# Patient Record
Sex: MALE | Race: WHITE | Employment: UNEMPLOYED | URBAN - METROPOLITAN AREA
[De-identification: names, ages, dates, MRNs, and addresses within clinical notes are randomized per-mention and may not be internally consistent; named-entity substitution may affect disease eponyms.]

---

## 2017-03-14 ENCOUNTER — TELEPHONE (OUTPATIENT)
Dept: PULMONOLOGY | Age: 57
End: 2017-03-14

## 2017-03-22 ENCOUNTER — OFFICE VISIT (OUTPATIENT)
Dept: INTERNAL MEDICINE CLINIC | Age: 57
End: 2017-03-22

## 2017-03-22 VITALS
RESPIRATION RATE: 16 BRPM | DIASTOLIC BLOOD PRESSURE: 82 MMHG | BODY MASS INDEX: 29.2 KG/M2 | HEIGHT: 70 IN | SYSTOLIC BLOOD PRESSURE: 130 MMHG | WEIGHT: 204 LBS

## 2017-03-22 DIAGNOSIS — L73.1 INGROWN HAIR: ICD-10-CM

## 2017-03-22 DIAGNOSIS — M70.62 TROCHANTERIC BURSITIS, LEFT HIP: Primary | ICD-10-CM

## 2017-03-22 PROCEDURE — 99214 OFFICE O/P EST MOD 30 MIN: CPT | Performed by: INTERNAL MEDICINE

## 2017-03-22 RX ORDER — ETODOLAC 400 MG/1
400 TABLET, FILM COATED ORAL 2 TIMES DAILY
Qty: 60 TABLET | Refills: 3 | Status: SHIPPED | OUTPATIENT
Start: 2017-03-22 | End: 2019-05-07

## 2017-03-22 RX ORDER — CETIRIZINE HYDROCHLORIDE, PSEUDOEPHEDRINE HYDROCHLORIDE 5; 120 MG/1; MG/1
1 TABLET, FILM COATED, EXTENDED RELEASE ORAL 2 TIMES DAILY
Qty: 60 TABLET | Refills: 5 | Status: SHIPPED | OUTPATIENT
Start: 2017-03-22 | End: 2017-04-21

## 2017-03-22 RX ORDER — METHYLPREDNISOLONE 4 MG/1
TABLET ORAL
Qty: 1 KIT | Refills: 0 | Status: SHIPPED | OUTPATIENT
Start: 2017-03-22 | End: 2017-03-28

## 2017-03-28 ENCOUNTER — OFFICE VISIT (OUTPATIENT)
Dept: ORTHOPEDIC SURGERY | Age: 57
End: 2017-03-28

## 2017-03-28 VITALS
RESPIRATION RATE: 16 BRPM | DIASTOLIC BLOOD PRESSURE: 71 MMHG | HEIGHT: 70 IN | WEIGHT: 204 LBS | SYSTOLIC BLOOD PRESSURE: 111 MMHG | TEMPERATURE: 98 F | HEART RATE: 75 BPM | BODY MASS INDEX: 29.2 KG/M2

## 2017-03-28 DIAGNOSIS — M17.12 ARTHRITIS OF LEFT KNEE: Primary | ICD-10-CM

## 2017-03-28 PROCEDURE — 20610 DRAIN/INJ JOINT/BURSA W/O US: CPT | Performed by: PHYSICIAN ASSISTANT

## 2017-03-28 PROCEDURE — 99213 OFFICE O/P EST LOW 20 MIN: CPT | Performed by: PHYSICIAN ASSISTANT

## 2017-05-12 ENCOUNTER — OFFICE VISIT (OUTPATIENT)
Dept: INTERNAL MEDICINE CLINIC | Age: 57
End: 2017-05-12

## 2017-05-12 VITALS — HEIGHT: 70 IN | BODY MASS INDEX: 29.2 KG/M2 | TEMPERATURE: 97.8 F | RESPIRATION RATE: 16 BRPM | WEIGHT: 204 LBS

## 2017-05-12 DIAGNOSIS — J20.9 BRONCHITIS WITH BRONCHOSPASM: Primary | ICD-10-CM

## 2017-05-12 DIAGNOSIS — J01.00 ACUTE MAXILLARY SINUSITIS, RECURRENCE NOT SPECIFIED: ICD-10-CM

## 2017-05-12 PROCEDURE — 99214 OFFICE O/P EST MOD 30 MIN: CPT | Performed by: INTERNAL MEDICINE

## 2017-05-12 RX ORDER — AMOXICILLIN AND CLAVULANATE POTASSIUM 875; 125 MG/1; MG/1
1 TABLET, FILM COATED ORAL 2 TIMES DAILY
Qty: 20 TABLET | Refills: 0 | Status: SHIPPED | OUTPATIENT
Start: 2017-05-12 | End: 2017-05-22

## 2017-05-12 RX ORDER — METHYLPREDNISOLONE 4 MG/1
TABLET ORAL
Qty: 1 KIT | Refills: 0 | Status: SHIPPED | OUTPATIENT
Start: 2017-05-12 | End: 2017-05-18

## 2017-05-18 ENCOUNTER — TELEPHONE (OUTPATIENT)
Dept: INTERNAL MEDICINE CLINIC | Age: 57
End: 2017-05-18

## 2017-05-24 ENCOUNTER — OFFICE VISIT (OUTPATIENT)
Dept: INTERNAL MEDICINE CLINIC | Age: 57
End: 2017-05-24

## 2017-05-24 VITALS — TEMPERATURE: 98.3 F | HEIGHT: 70 IN | WEIGHT: 202 LBS | BODY MASS INDEX: 28.92 KG/M2 | RESPIRATION RATE: 16 BRPM

## 2017-05-24 DIAGNOSIS — H66.005 RECURRENT ACUTE SUPPURATIVE OTITIS MEDIA WITHOUT SPONTANEOUS RUPTURE OF LEFT TYMPANIC MEMBRANE: Primary | ICD-10-CM

## 2017-05-24 PROCEDURE — 99213 OFFICE O/P EST LOW 20 MIN: CPT | Performed by: INTERNAL MEDICINE

## 2017-05-24 RX ORDER — METHYLPREDNISOLONE 4 MG/1
TABLET ORAL
Qty: 1 KIT | Refills: 0 | Status: SHIPPED | OUTPATIENT
Start: 2017-05-24 | End: 2017-05-30

## 2017-05-24 RX ORDER — FLUTICASONE PROPIONATE 50 MCG
2 SPRAY, SUSPENSION (ML) NASAL 2 TIMES DAILY WITH MEALS
Qty: 1 BOTTLE | Refills: 3 | Status: SHIPPED | OUTPATIENT
Start: 2017-05-24 | End: 2019-05-07

## 2017-05-31 ENCOUNTER — OFFICE VISIT (OUTPATIENT)
Dept: INTERNAL MEDICINE CLINIC | Age: 57
End: 2017-05-31

## 2017-05-31 VITALS
DIASTOLIC BLOOD PRESSURE: 66 MMHG | SYSTOLIC BLOOD PRESSURE: 104 MMHG | RESPIRATION RATE: 16 BRPM | WEIGHT: 202 LBS | BODY MASS INDEX: 28.92 KG/M2 | HEIGHT: 70 IN

## 2017-05-31 DIAGNOSIS — M10.071 ACUTE IDIOPATHIC GOUT INVOLVING TOE OF RIGHT FOOT: Primary | ICD-10-CM

## 2017-05-31 PROCEDURE — 99213 OFFICE O/P EST LOW 20 MIN: CPT | Performed by: INTERNAL MEDICINE

## 2017-05-31 RX ORDER — METHYLPREDNISOLONE 4 MG/1
TABLET ORAL
Qty: 1 KIT | Refills: 0 | Status: SHIPPED | OUTPATIENT
Start: 2017-05-31 | End: 2017-06-06

## 2017-05-31 RX ORDER — INDOMETHACIN 50 MG/1
50 CAPSULE ORAL 3 TIMES DAILY
Qty: 30 CAPSULE | Refills: 3 | Status: SHIPPED | OUTPATIENT
Start: 2017-05-31 | End: 2019-05-07

## 2017-07-14 ENCOUNTER — OFFICE VISIT (OUTPATIENT)
Dept: INTERNAL MEDICINE CLINIC | Age: 57
End: 2017-07-14

## 2017-07-14 VITALS
HEIGHT: 70 IN | BODY MASS INDEX: 28.92 KG/M2 | SYSTOLIC BLOOD PRESSURE: 108 MMHG | RESPIRATION RATE: 16 BRPM | WEIGHT: 202 LBS | DIASTOLIC BLOOD PRESSURE: 64 MMHG

## 2017-07-14 DIAGNOSIS — G89.29 CHRONIC RIGHT SHOULDER PAIN: Primary | ICD-10-CM

## 2017-07-14 DIAGNOSIS — M25.511 CHRONIC RIGHT SHOULDER PAIN: Primary | ICD-10-CM

## 2017-07-14 PROCEDURE — 99213 OFFICE O/P EST LOW 20 MIN: CPT | Performed by: INTERNAL MEDICINE

## 2017-07-14 RX ORDER — LIDOCAINE 50 MG/G
1 PATCH TOPICAL DAILY
Qty: 30 PATCH | Refills: 0 | Status: SHIPPED | OUTPATIENT
Start: 2017-07-14 | End: 2019-05-07

## 2017-07-17 ENCOUNTER — OFFICE VISIT (OUTPATIENT)
Dept: ORTHOPEDIC SURGERY | Age: 57
End: 2017-07-17

## 2017-07-17 VITALS
TEMPERATURE: 98.6 F | HEIGHT: 70 IN | HEART RATE: 65 BPM | RESPIRATION RATE: 15 BRPM | SYSTOLIC BLOOD PRESSURE: 136 MMHG | WEIGHT: 202 LBS | DIASTOLIC BLOOD PRESSURE: 73 MMHG | BODY MASS INDEX: 28.92 KG/M2

## 2017-07-17 DIAGNOSIS — M75.81 RIGHT ROTATOR CUFF TENDONITIS: ICD-10-CM

## 2017-07-17 DIAGNOSIS — M25.562 ACUTE PAIN OF LEFT KNEE: Primary | ICD-10-CM

## 2017-07-17 DIAGNOSIS — M25.511 RIGHT SHOULDER PAIN, UNSPECIFIED CHRONICITY: ICD-10-CM

## 2017-07-17 DIAGNOSIS — M17.12 ARTHRITIS OF LEFT KNEE: ICD-10-CM

## 2017-07-17 PROCEDURE — 20610 DRAIN/INJ JOINT/BURSA W/O US: CPT | Performed by: PHYSICIAN ASSISTANT

## 2017-07-17 PROCEDURE — 99214 OFFICE O/P EST MOD 30 MIN: CPT | Performed by: PHYSICIAN ASSISTANT

## 2017-07-19 PROBLEM — M17.12 ARTHRITIS OF LEFT KNEE: Status: ACTIVE | Noted: 2017-07-19

## 2017-07-19 PROBLEM — M75.81 RIGHT ROTATOR CUFF TENDONITIS: Status: ACTIVE | Noted: 2017-07-19

## 2018-02-05 ENCOUNTER — OFFICE VISIT (OUTPATIENT)
Dept: INTERNAL MEDICINE CLINIC | Age: 58
End: 2018-02-05

## 2018-02-05 VITALS — BODY MASS INDEX: 28.92 KG/M2 | TEMPERATURE: 98.2 F | RESPIRATION RATE: 16 BRPM | HEIGHT: 70 IN | WEIGHT: 202 LBS

## 2018-02-05 DIAGNOSIS — J45.20 MILD INTERMITTENT ASTHMA WITHOUT COMPLICATION: ICD-10-CM

## 2018-02-05 DIAGNOSIS — J20.9 BRONCHITIS, ACUTE, WITH BRONCHOSPASM: Primary | ICD-10-CM

## 2018-02-05 PROCEDURE — G8419 CALC BMI OUT NRM PARAM NOF/U: HCPCS | Performed by: INTERNAL MEDICINE

## 2018-02-05 PROCEDURE — G8484 FLU IMMUNIZE NO ADMIN: HCPCS | Performed by: INTERNAL MEDICINE

## 2018-02-05 PROCEDURE — 3017F COLORECTAL CA SCREEN DOC REV: CPT | Performed by: INTERNAL MEDICINE

## 2018-02-05 PROCEDURE — 99214 OFFICE O/P EST MOD 30 MIN: CPT | Performed by: INTERNAL MEDICINE

## 2018-02-05 PROCEDURE — 1036F TOBACCO NON-USER: CPT | Performed by: INTERNAL MEDICINE

## 2018-02-05 PROCEDURE — G8427 DOCREV CUR MEDS BY ELIG CLIN: HCPCS | Performed by: INTERNAL MEDICINE

## 2018-02-05 RX ORDER — GUAIFENESIN AND PSEUDOEPHEDRINE HCL 1200; 120 MG/1; MG/1
TABLET, EXTENDED RELEASE ORAL
Qty: 20 TABLET | Refills: 0 | Status: SHIPPED | OUTPATIENT
Start: 2018-02-05 | End: 2019-05-07

## 2018-02-05 RX ORDER — ALBUTEROL SULFATE 90 UG/1
2 AEROSOL, METERED RESPIRATORY (INHALATION) EVERY 6 HOURS PRN
Qty: 1 INHALER | Refills: 3 | Status: SHIPPED | OUTPATIENT
Start: 2018-02-05 | End: 2019-02-05 | Stop reason: SDUPTHER

## 2018-02-05 RX ORDER — METHYLPREDNISOLONE 4 MG/1
TABLET ORAL
Qty: 1 KIT | Refills: 0 | Status: SHIPPED | OUTPATIENT
Start: 2018-02-05 | End: 2018-02-11

## 2018-02-05 RX ORDER — LEVOFLOXACIN 500 MG/1
500 TABLET, FILM COATED ORAL DAILY
Qty: 10 TABLET | Refills: 0 | Status: SHIPPED | OUTPATIENT
Start: 2018-02-05 | End: 2018-02-15

## 2018-02-05 RX ORDER — BUDESONIDE AND FORMOTEROL FUMARATE DIHYDRATE 160; 4.5 UG/1; UG/1
2 AEROSOL RESPIRATORY (INHALATION) 2 TIMES DAILY
Qty: 1 INHALER | Refills: 6 | Status: SHIPPED | OUTPATIENT
Start: 2018-02-05 | End: 2019-05-07

## 2019-05-02 ENCOUNTER — OFFICE VISIT (OUTPATIENT)
Dept: ORTHOPEDIC SURGERY | Age: 59
End: 2019-05-02
Payer: MEDICARE

## 2019-05-02 VITALS — TEMPERATURE: 98.4 F | HEIGHT: 70 IN | BODY MASS INDEX: 28.63 KG/M2 | WEIGHT: 200 LBS

## 2019-05-02 DIAGNOSIS — M17.12 ARTHRITIS OF LEFT KNEE: Primary | ICD-10-CM

## 2019-05-02 PROCEDURE — G8427 DOCREV CUR MEDS BY ELIG CLIN: HCPCS | Performed by: ORTHOPAEDIC SURGERY

## 2019-05-02 PROCEDURE — 99214 OFFICE O/P EST MOD 30 MIN: CPT | Performed by: ORTHOPAEDIC SURGERY

## 2019-05-02 PROCEDURE — 3017F COLORECTAL CA SCREEN DOC REV: CPT | Performed by: ORTHOPAEDIC SURGERY

## 2019-05-02 PROCEDURE — 1036F TOBACCO NON-USER: CPT | Performed by: ORTHOPAEDIC SURGERY

## 2019-05-02 PROCEDURE — G8419 CALC BMI OUT NRM PARAM NOF/U: HCPCS | Performed by: ORTHOPAEDIC SURGERY

## 2019-05-02 NOTE — PROGRESS NOTES
This dictation was done with [employer] dictation and may contain mechanical errors related to translation. Temperature 98.4 °F (36.9 °C), temperature source Temporal, height 5' 10\" (1.778 m), weight 200 lb (90.7 kg).

## 2019-05-02 NOTE — PATIENT INSTRUCTIONS
Patient Education        Knee: Exercises  Your Care Instructions  Here are some examples of exercises for your knee. Start each exercise slowly. Ease off the exercise if you start to have pain. Your doctor or physical therapist will tell you when you can start these exercises and which ones will work best for you. How to do the exercises  Quad sets    1. Sit with your leg straight and supported on the floor or a firm bed. (If you feel discomfort in the front or back of your knee, place a small towel roll under your knee.)  2. Tighten the muscles on top of your thigh by pressing the back of your knee flat down to the floor. (If you feel discomfort under your kneecap, place a small towel roll under your knee.)  3. Hold for about 6 seconds, then rest for up to 10 seconds. 4. Do 8 to 12 repetitions several times a day. Straight-leg raises to the front    1. Lie on your back with your good knee bent so that your foot rests flat on the floor. Your injured leg should be straight. Make sure that your low back has a normal curve. You should be able to slip your flat hand in between the floor and the small of your back, with your palm touching the floor and your back touching the back of your hand. 2. Tighten the thigh muscles in the injured leg by pressing the back of your knee flat down to the floor. Hold your knee straight. 3. Keeping the thigh muscles tight, lift your injured leg up so that your heel is about 12 inches off the floor. Hold for about 6 seconds and then lower slowly. 4. Do 8 to 12 repetitions, 3 times a day. Straight-leg raises to the outside    1. Lie on your side, with your injured leg on top. 2. Tighten the front thigh muscles of your injured leg to keep your knee straight. 3. Keep your hip and your leg straight in line with the rest of your body, and keep your knee pointing forward. Do not drop your hip back.   4. Lift your injured leg straight up toward the ceiling, about 12 inches off the weight to your ankle (not more than 5 pounds). With weight, you do not have to lift your leg more than 12 inches to get a hamstring workout. Shallow standing knee bends    1. Stand with your hands lightly resting on a counter or chair in front of you. Put your feet shoulder-width apart. 2. Slowly bend your knees so that you squat down like you are going to sit in a chair. Make sure your knees do not go in front of your toes. 3. Lower yourself about 6 inches. Your heels should remain on the floor at all times. 4. Rise slowly to a standing position. Heel raises    1. Stand with your feet a few inches apart, with your hands lightly resting on a counter or chair in front of you. 2. Slowly raise your heels off the floor while keeping your knees straight. 3. Hold for about 6 seconds, then slowly lower your heels to the floor. 4. Do 8 to 12 repetitions several times during the day. Follow-up care is a key part of your treatment and safety. Be sure to make and go to all appointments, and call your doctor if you are having problems. It's also a good idea to know your test results and keep a list of the medicines you take. Where can you learn more? Go to https://Between.[employer]. org and sign in to your [employer] account. Enter R605 in the [employer] box to learn more about \"Knee: Exercises. \"     If you do not have an account, please click on the \"Sign Up Now\" link. Current as of: September 20, 2018  Content Version: 11.9  © 2683-3307 [employer]. Care instructions adapted under license by [facility] ([facility]). If you have questions about a medical condition or this instruction, always ask your healthcare professional. [address] any warranty or liability for your use of this information.

## 2019-05-02 NOTE — LETTER
[address] [facility]  Surgery Scheduling Form:  [facility]    DEMOGRAPHICS:                                                                                                                Patient Name:  Severiano Feinstein  Patient :  1960   Patient SS#:      Patient Phone:  [phone] (home)                            Patient Address:  [address] [place] [address]    PCP:  Reyna Trejo MD  Insurance:   Payor/Plan Subscr  Sex Relation Sub. Ins. ID Effective Group Num   1. [employer]  1960 Male Self [id number]                                   [address]     DIAGNOSIS & PROCEDURE:                                                                                              Diagnosis:     Left arthritis knee     M17.12   Operation:  Left Total Knee Replacement   [address]  Location:  [place] [address]  Surgeon:  Parth Tanner. Ayesha Reyes MD    SCHEDULING INFORMATION:                                                                                         .  Surgeon's Scheduling Instruction:  elective  Requested Date:  7-3   OR Time:   Patient Arrival Time:  OR Time Required:  90  Minutes  Anesthesia:  Choice  Equipment:  ashley uncemented advanced  Mini C-Arm:  No   Standard C-Arm:  No  Status:  Same day admit  PAT Required:  Yes  Comments: NO femoral nerve block   ALLERGIES:Other                     Raul Edouard MD      19 3:54 PM  BILLING INFORMATION:                                                                                                     Procedure:       CPT Code Modifier                                            H&P AT:       PCP  XX                      [address]  LEFT TOTAL KNEE REPLACEMENT   1960     PHYSICIANS ORDERS  HEIGHT:   5'10          WEIGHT:   200      ALLERGIES:Other                           SURG  7-3                         JET     __________________________________________________________________  PRE-OP ORDERS:  ? CBC WITH DIFFERENTIAL                                                ? TYPE AND SCREEN                                                            ? HgB A1C                                                                               ? EKG                                                                                        ? NASAL CULUTRE MRSA  ? UAR/if positive repeat UAR on admission  ? BMP           ALBUMIN AND PREALBUMIN           VITAMIN D LEVELS  ? COAG PROFILE  ? SED RATE  ? PT/OT EVAL AND TEACHING  ? INSTRUCT PT TO STOP ALL NSAIDS, ASPIRIN, BLOOD THINNERS 7 DAYS PRIOR SURGERY  DAY OF SURGERY  ? CEFAZOLIN 2 GM IVPB; IF PATIENT WEIGHS > 80 KG AND SERUM CREATININE <2.5 mg/dl, GIVE 2 GM DOSE WITHIN 1 HOUR OF INCISION. ? IF THE PRE-OP NASAL CULTURE FOR MRSA WAS POSITIVE:   REPEAT NASAL SWAB ON ADMISSION AND ADMINISTER VANCOMYCIN 1 GM IVPB, REDUCE THE DOSE OF VANCOMYCIN  MG IVPB IF PT < 55 KG OR SERUM CREATININE > 2mg/dl; also to get Cefazolin 2 GM or wt based  ? All patients will receive preop Cefazolin 2 GM or wt based   ? APPLY KNEE HIGH ANTI-EMBOLIC AND PNEUMO-BOOTS TO UNOPERATIVE  LEG  ? CELEBREX  200 MG  ORALLY  DAY OF SURGERY  ? ROXICODONE 10MG  ORALLY DAY OF SURGERY  OTHER ORDERS:_______________________________________________________PHYSICIAN SIGNATURE: __ 5/2/19                                                                                                       3:54 PM  ________________________DATE:                          Supplemental Confidentiality & Payment Agreement & Supplemental HIPAA Notice for Shared Medical Appointments        During shared medical appointments, you will hear about other participants health issues and personal information.   As a matter of trust, it is your duty to keep everything you hear confidential.  Nothing that identifies a participant in any way (including job, ethnicity, Synagogue, etc.) can be shared outside of this group setting. I have read and agree to the following statements:        · I agree to meet with a group of patients and my doctor. I understand that I have the choice to be seen by my physician in this group or individually and that I may leave the group visit anytime I feel uncomfortable. · I understand that discussions will occur regarding individual identifiable health information during the shared medical appointment and I will maintain the confidentiality of [facility] health information or other information heard during the appointment. · I am committed to maintaining this confidentiality even if I am no longer participating in shared medical appointments. · I understand that the information I share with the physician and staff will be heard by the other participants and there is a possibility that the information disclosed in the shared medical appointment may be re-disclosed by other participants. I have been notified of this potential disclosure and I voluntarily agree to participate in the shared medical appointment. · I know that I dont have to share any personal information with the group or health care providers unless, I choose to do so. · Like any doctors appointment, I agree to be responsible for the bill and / or co-payment associated with this physician appointment. Shared Medical Appointment              THIS SUPPLEMENTAL NOTICE SUPPLEMENTS AND DOES NOT REPLACE THE [facility] CONSENT, PATIENT RESPONSIBILITY AND NOTICE OF PRIVACY PRACTICE. [place]    1960        Patients Signature: ____________________________________________________         DATE: ____/____/___      Ramy Leung / Support Persons Signature (if applicable) _________________________     DATE: __/__/__    **Each person will be asked to sign this commitment before each Shared Medical Appointment. Thank You ! SURGERY SCHEDULING TIMES                                                                                                                                                      Erica Jeter                                                                                       1960                                                                           SURGERY DATE: ___/___/___                                                                      SURGERY TIME:  ___:___ AM/PM                                                                      ARRIVAL TIME:   ___:___  AM/PM                                                                                                                        **PLEASE REPORT TO THE                                                       INFORMATION DESK IN THE MAIN LOBBY                                                          OF THE HOSPITAL.         [facility]                                                                        JET INFO           PT NAME:  Erica Jeter              Patient Phone:  [phone] (home)                                           1960    PCP:  PCP:  Daryle Star, MD                APPT DATE:  ___/___/___      DATE:  19        H&P __________    LABS: _________                      NEEDED:  __________    EKG:   _________                     NEEDED:  __________      JET DATE:       SURG DATE:   7-3

## 2019-05-03 DIAGNOSIS — M17.12 ARTHRITIS OF LEFT KNEE: ICD-10-CM

## 2019-05-03 LAB
ALBUMIN SERPL-MCNC: 4.2 G/DL (ref 3.4–5)
ANION GAP SERPL CALCULATED.3IONS-SCNC: 12 MMOL/L (ref 3–16)
APTT: 35.5 SEC (ref 26–36)
BASOPHILS ABSOLUTE: 0.1 K/UL (ref 0–0.2)
BASOPHILS RELATIVE PERCENT: 0.8 %
BUN BLDV-MCNC: 14 MG/DL (ref 7–20)
CALCIUM SERPL-MCNC: 9.4 MG/DL (ref 8.3–10.6)
CHLORIDE BLD-SCNC: 101 MMOL/L (ref 99–110)
CO2: 26 MMOL/L (ref 21–32)
CREAT SERPL-MCNC: 0.8 MG/DL (ref 0.9–1.3)
EOSINOPHILS ABSOLUTE: 0.7 K/UL (ref 0–0.6)
EOSINOPHILS RELATIVE PERCENT: 7.6 %
GFR AFRICAN AMERICAN: >60
GFR NON-AFRICAN AMERICAN: >60
GLUCOSE BLD-MCNC: 84 MG/DL (ref 70–99)
HCT VFR BLD CALC: 52.8 % (ref 40.5–52.5)
HEMOGLOBIN: 18.1 G/DL (ref 13.5–17.5)
INR BLD: 1.04 (ref 0.86–1.14)
LYMPHOCYTES ABSOLUTE: 3.2 K/UL (ref 1–5.1)
LYMPHOCYTES RELATIVE PERCENT: 36.2 %
MCH RBC QN AUTO: 33.9 PG (ref 26–34)
MCHC RBC AUTO-ENTMCNC: 34.3 G/DL (ref 31–36)
MCV RBC AUTO: 98.9 FL (ref 80–100)
MONOCYTES ABSOLUTE: 1 K/UL (ref 0–1.3)
MONOCYTES RELATIVE PERCENT: 11.5 %
NEUTROPHILS ABSOLUTE: 3.9 K/UL (ref 1.7–7.7)
NEUTROPHILS RELATIVE PERCENT: 43.9 %
PDW BLD-RTO: 14.4 % (ref 12.4–15.4)
PLATELET # BLD: 227 K/UL (ref 135–450)
PMV BLD AUTO: 10 FL (ref 5–10.5)
POTASSIUM SERPL-SCNC: 5.4 MMOL/L (ref 3.5–5.1)
PROTHROMBIN TIME: 11.8 SEC (ref 9.8–13)
RBC # BLD: 5.34 M/UL (ref 4.2–5.9)
SODIUM BLD-SCNC: 139 MMOL/L (ref 136–145)
TRANSFERRIN: 281 MG/DL (ref 200–360)
VITAMIN D 25-HYDROXY: 27.1 NG/ML
WBC # BLD: 8.8 K/UL (ref 4–11)

## 2019-05-04 LAB
ESTIMATED AVERAGE GLUCOSE: 119.8 MG/DL
HBA1C MFR BLD: 5.8 %

## 2019-05-06 ENCOUNTER — APPOINTMENT (OUTPATIENT)
Dept: CT IMAGING | Age: 59
End: 2019-05-06
Payer: MEDICARE

## 2019-05-06 ENCOUNTER — HOSPITAL ENCOUNTER (EMERGENCY)
Age: 59
Discharge: HOME | End: 2019-05-06
Attending: EMERGENCY MEDICINE
Payer: MEDICARE

## 2019-05-06 ENCOUNTER — APPOINTMENT (OUTPATIENT)
Dept: GENERAL RADIOLOGY | Age: 59
End: 2019-05-06
Payer: MEDICARE

## 2019-05-06 ENCOUNTER — PREP FOR PROCEDURE (OUTPATIENT)
Dept: ORTHOPEDICS UNIT | Age: 59
End: 2019-05-06

## 2019-05-06 VITALS
RESPIRATION RATE: 17 BRPM | OXYGEN SATURATION: 96 % | BODY MASS INDEX: 29.04 KG/M2 | SYSTOLIC BLOOD PRESSURE: 141 MMHG | HEIGHT: 71 IN | HEART RATE: 83 BPM | TEMPERATURE: 97 F | DIASTOLIC BLOOD PRESSURE: 70 MMHG | WEIGHT: 207.45 LBS

## 2019-05-06 DIAGNOSIS — S12.000A CLOSED DISPLACED FRACTURE OF FIRST CERVICAL VERTEBRA, UNSPECIFIED FRACTURE MORPHOLOGY, INITIAL ENCOUNTER (HCC): Primary | ICD-10-CM

## 2019-05-06 LAB
A/G RATIO: 0.7 (ref 1.1–2.2)
ALBUMIN SERPL-MCNC: 3.4 G/DL (ref 3.4–5)
ALP BLD-CCNC: 78 U/L (ref 40–129)
ALT SERPL-CCNC: 97 U/L (ref 10–40)
ANION GAP SERPL CALCULATED.3IONS-SCNC: 10 MMOL/L (ref 3–16)
APTT: 35.4 SEC (ref 26–36)
AST SERPL-CCNC: 81 U/L (ref 15–37)
BASOPHILS ABSOLUTE: 0.1 K/UL (ref 0–0.2)
BASOPHILS RELATIVE PERCENT: 0.8 %
BILIRUB SERPL-MCNC: 0.8 MG/DL (ref 0–1)
BILIRUBIN URINE: NEGATIVE
BLOOD, URINE: NEGATIVE
BUN BLDV-MCNC: 10 MG/DL (ref 7–20)
CALCIUM SERPL-MCNC: 9 MG/DL (ref 8.3–10.6)
CHLORIDE BLD-SCNC: 103 MMOL/L (ref 99–110)
CLARITY: CLEAR
CO2: 19 MMOL/L (ref 21–32)
COLOR: YELLOW
CREAT SERPL-MCNC: 0.7 MG/DL (ref 0.9–1.3)
EOSINOPHILS ABSOLUTE: 0.4 K/UL (ref 0–0.6)
EOSINOPHILS RELATIVE PERCENT: 3.3 %
GFR AFRICAN AMERICAN: >60
GFR NON-AFRICAN AMERICAN: >60
GLOBULIN: 4.6 G/DL
GLUCOSE BLD-MCNC: 105 MG/DL (ref 70–99)
GLUCOSE URINE: NEGATIVE MG/DL
HCT VFR BLD CALC: 51.6 % (ref 40.5–52.5)
HEMOGLOBIN: 17.7 G/DL (ref 13.5–17.5)
INR BLD: 1.07 (ref 0.86–1.14)
KETONES, URINE: NEGATIVE MG/DL
LEUKOCYTE ESTERASE, URINE: NEGATIVE
LYMPHOCYTES ABSOLUTE: 3 K/UL (ref 1–5.1)
LYMPHOCYTES RELATIVE PERCENT: 24.8 %
MCH RBC QN AUTO: 34 PG (ref 26–34)
MCHC RBC AUTO-ENTMCNC: 34.3 G/DL (ref 31–36)
MCV RBC AUTO: 99.2 FL (ref 80–100)
MICROSCOPIC EXAMINATION: NORMAL
MONOCYTES ABSOLUTE: 1.2 K/UL (ref 0–1.3)
MONOCYTES RELATIVE PERCENT: 10.3 %
NEUTROPHILS ABSOLUTE: 7.3 K/UL (ref 1.7–7.7)
NEUTROPHILS RELATIVE PERCENT: 60.8 %
NITRITE, URINE: NEGATIVE
PDW BLD-RTO: 14.1 % (ref 12.4–15.4)
PH UA: 5.5 (ref 5–8)
PLATELET # BLD: 198 K/UL (ref 135–450)
PMV BLD AUTO: 9 FL (ref 5–10.5)
POTASSIUM REFLEX MAGNESIUM: 4.3 MMOL/L (ref 3.5–5.1)
PROTEIN UA: NEGATIVE MG/DL
PROTHROMBIN TIME: 12.2 SEC (ref 9.8–13)
RBC # BLD: 5.21 M/UL (ref 4.2–5.9)
SODIUM BLD-SCNC: 132 MMOL/L (ref 136–145)
SPECIFIC GRAVITY UA: 1.02 (ref 1–1.03)
TOTAL PROTEIN: 8 G/DL (ref 6.4–8.2)
URINE TYPE: NORMAL
UROBILINOGEN, URINE: 0.2 E.U./DL
WBC # BLD: 11.9 K/UL (ref 4–11)

## 2019-05-06 PROCEDURE — 96374 THER/PROPH/DIAG INJ IV PUSH: CPT

## 2019-05-06 PROCEDURE — 85730 THROMBOPLASTIN TIME PARTIAL: CPT

## 2019-05-06 PROCEDURE — 72131 CT LUMBAR SPINE W/O DYE: CPT

## 2019-05-06 PROCEDURE — 96375 TX/PRO/DX INJ NEW DRUG ADDON: CPT

## 2019-05-06 PROCEDURE — 99284 EMERGENCY DEPT VISIT MOD MDM: CPT

## 2019-05-06 PROCEDURE — 72125 CT NECK SPINE W/O DYE: CPT

## 2019-05-06 PROCEDURE — 73560 X-RAY EXAM OF KNEE 1 OR 2: CPT

## 2019-05-06 PROCEDURE — 71101 X-RAY EXAM UNILAT RIBS/CHEST: CPT

## 2019-05-06 PROCEDURE — 81003 URINALYSIS AUTO W/O SCOPE: CPT

## 2019-05-06 PROCEDURE — 70498 CT ANGIOGRAPHY NECK: CPT

## 2019-05-06 PROCEDURE — 6360000004 HC RX CONTRAST MEDICATION: Performed by: EMERGENCY MEDICINE

## 2019-05-06 PROCEDURE — 6360000002 HC RX W HCPCS: Performed by: EMERGENCY MEDICINE

## 2019-05-06 PROCEDURE — 85610 PROTHROMBIN TIME: CPT

## 2019-05-06 PROCEDURE — 80053 COMPREHEN METABOLIC PANEL: CPT

## 2019-05-06 PROCEDURE — 70450 CT HEAD/BRAIN W/O DYE: CPT

## 2019-05-06 PROCEDURE — 85025 COMPLETE CBC W/AUTO DIFF WBC: CPT

## 2019-05-06 PROCEDURE — 72128 CT CHEST SPINE W/O DYE: CPT

## 2019-05-06 RX ORDER — OXYCODONE HYDROCHLORIDE AND ACETAMINOPHEN 5; 325 MG/1; MG/1
1 TABLET ORAL EVERY 6 HOURS PRN
Qty: 20 TABLET | Refills: 0 | Status: SHIPPED | OUTPATIENT
Start: 2019-05-06 | End: 2019-05-13

## 2019-05-06 RX ORDER — CELECOXIB 200 MG/1
200 CAPSULE ORAL ONCE
Status: CANCELLED | OUTPATIENT
Start: 2019-05-06 | End: 2019-05-06

## 2019-05-06 RX ORDER — MORPHINE SULFATE 2 MG/ML
4 INJECTION, SOLUTION INTRAMUSCULAR; INTRAVENOUS ONCE
Status: COMPLETED | OUTPATIENT
Start: 2019-05-06 | End: 2019-05-06

## 2019-05-06 RX ORDER — ONDANSETRON 2 MG/ML
4 INJECTION INTRAMUSCULAR; INTRAVENOUS ONCE
Status: COMPLETED | OUTPATIENT
Start: 2019-05-06 | End: 2019-05-06

## 2019-05-06 RX ORDER — OXYCODONE HYDROCHLORIDE 5 MG/1
10 TABLET ORAL ONCE
Status: CANCELLED | OUTPATIENT
Start: 2019-05-06 | End: 2019-05-06

## 2019-05-06 RX ORDER — CYCLOBENZAPRINE HCL 10 MG
10 TABLET ORAL NIGHTLY PRN
Qty: 30 TABLET | Refills: 0 | Status: SHIPPED | OUTPATIENT
Start: 2019-05-06 | End: 2019-05-16

## 2019-05-06 RX ADMIN — IOPAMIDOL 75 ML: 755 INJECTION, SOLUTION INTRAVENOUS at 13:31

## 2019-05-06 RX ADMIN — MORPHINE SULFATE 4 MG: 2 INJECTION, SOLUTION INTRAMUSCULAR; INTRAVENOUS at 12:35

## 2019-05-06 RX ADMIN — ONDANSETRON 4 MG: 2 INJECTION INTRAMUSCULAR; INTRAVENOUS at 12:34

## 2019-05-06 ASSESSMENT — PAIN SCALES - GENERAL
PAINLEVEL_OUTOF10: 9
PAINLEVEL_OUTOF10: 9
PAINLEVEL_OUTOF10: 3
PAINLEVEL_OUTOF10: 2
PAINLEVEL_OUTOF10: 9

## 2019-05-06 ASSESSMENT — PAIN DESCRIPTION - ONSET: ONSET: ON-GOING

## 2019-05-06 ASSESSMENT — PAIN DESCRIPTION - DESCRIPTORS: DESCRIPTORS: SHOOTING;SHARP

## 2019-05-06 ASSESSMENT — PAIN DESCRIPTION - ORIENTATION: ORIENTATION: RIGHT;LEFT

## 2019-05-06 ASSESSMENT — PAIN - FUNCTIONAL ASSESSMENT: PAIN_FUNCTIONAL_ASSESSMENT: ACTIVITIES ARE NOT PREVENTED

## 2019-05-06 ASSESSMENT — PAIN DESCRIPTION - FREQUENCY: FREQUENCY: CONTINUOUS

## 2019-05-06 ASSESSMENT — PAIN DESCRIPTION - LOCATION: LOCATION: NECK

## 2019-05-06 ASSESSMENT — PAIN DESCRIPTION - PAIN TYPE: TYPE: ACUTE PAIN

## 2019-05-06 ASSESSMENT — PAIN DESCRIPTION - PROGRESSION: CLINICAL_PROGRESSION: GRADUALLY WORSENING

## 2019-05-06 NOTE — ED PROVIDER NOTES
eMERGENCY dEPARTMENT eNCOUnter      Pt Name: Gaurav Villalobos  MRN: [id number]  [place] 1960  Date of evaluation: 5/6/2019  Provider: Stephenie Harding MD     [address] [place] [address]       Chief Complaint   Patient presents with    Fall     1900 yesterday, pt was cleaning gutters, fell 15 feet to ground. Pt states event is \"blurry\" to him, but does not believe he lost consciousness.  Neck Pain     stiff/sore, shooting pain 10/10 when turning neck, 4/10 when still.  Knee Pain     L knee 9/10. knee replacement scheduled 5/28/19. HISTORY OF PRESENT ILLNESS   (Location/Symptom, Timing/Onset,Context/Setting, Quality, Duration, Modifying Factors, Severity) Note limiting factors. HPI    Gaurav Villalobos is a 61 y.o. male who presents to the emergency department with neck pain after a fall from a two-story building. Patient apparently was working on his roof when he fell off. Patient landed on the ground head first and also landed on the left knee. Patient has history of knee pain and right chest wall pain. He thinks he might have broken a rib. He states this happened last night around 7 PM.  He was able to get up and took some Tylenol. She then went to bed and woke up this morning still having pain and drove himself to the emergency department for evaluation. Patient denies any numbness. There is no focal weakness. No nausea vomiting. Patient has no blurred vision. Nursing Notes were reviewed. REVIEW OFSYSTEMS    (2+ for level 4; 10+ for level 5)   Review of Systems    General: No fevers, chills or night sweats, No weight loss    Head:  No Sore throat,  No Ear Pain    Chest:  Nontender. No Cough, No SOB,  Chest Pain    GI: No abdominal pain or vomiting    : No dysuria or hematuria    Musculoskeletal: No unrelenting pain or night pain    Neurologic: No bowel or bladder incontinence, No saddle anesthesia, No leg weakness    All other systems reviewed and are negative.         PAST MEDICAL HISTORY Past Medical History:   Diagnosis Date    Arthritis     Asthma     Asthmatic bronchitis    Cancer ([facility] [address] 23 years ago    non-hodgkins lymphoma--5 weeks of radiation    Chronic pain of left knee 6/23/2016    Environmental and seasonal allergies 06/23/2016    cats/dogs/mold/milk    Hepatitis C     Moderate persistent asthma without complication 5/64/9863       SURGICAL HISTORY       Past Surgical History:   Procedure Laterality Date    APPENDECTOMY      KNEE ARTHROSCOPY Left 09/13/2016    L knee video arthroscopy with partial lateral menisectomy and plica resection    LYMPH NODE BIOPSY      lymph node in groin area    SHOULDER SURGERY Left 1978    reconstruction of left shoulder        CURRENT MEDICATIONS       Previous Medications    ALBUTEROL (PROVENTIL) (2.5 MG/3ML) 0.083% NEBULIZER SOLUTION    Take 2.5 mg by nebulization every 6 hours as needed for Wheezing or Shortness of Breath    BIOTIN 5000 MCG TABS    Take by mouth    BUDESONIDE-FORMOTEROL (SYMBICORT) 160-4.5 MCG/ACT AERO    Inhale 2 puffs into the lungs 2 times daily    DICLOFENAC SODIUM (VOLTAREN) 1 % GEL    Apply 4 g topically 4 times daily    ETODOLAC (LODINE) 400 MG TABLET    Take 1 tablet by mouth 2 times daily    FLUTICASONE (FLONASE) 50 MCG/ACT NASAL SPRAY    2 sprays by Nasal route 2 times daily (with meals)    INDOMETHACIN (INDOCIN) 50 MG CAPSULE    Take 1 capsule by mouth 3 times daily    LIDOCAINE (LIDODERM) 5 %    Place 1 patch onto the skin daily 12 hours on, 12 hours off. METHYLPREDNISOLONE (MEDROL DOSEPACK) 4 MG TABLET    Take 4 mg by mouth See Admin Instructions Take by mouth.     MONTELUKAST (SINGULAIR) 10 MG TABLET    Take 1 tablet by mouth nightly    MONTELUKAST (SINGULAIR) 10 MG TABLET    Take 1 tablet by mouth nightly    NAPROXEN SODIUM (ALEVE PO)    Take by mouth as needed     NONFORMULARY    15 drops 2 times daily HEMP OIL    OMEPRAZOLE (PRILOSEC) 40 MG CAPSULE    Take 1 capsule by mouth daily Take tablet one hour before evening meal    OMEPRAZOLE (PRILOSEC) 40 MG DELAYED RELEASE CAPSULE    Take 1 capsule by mouth daily Take tablet one hour before evening meal    PSEUDOEPHEDRINE-GUAIFENESIN 120-1200 MG TB12    1 tab po bid with a lot of fluids. VENTOLIN  (90 BASE) MCG/ACT INHALER    INHALE TWO PUFFS BY MOUTH EVERY 6 HOURS AS NEEDED FOR WHEEZING       ALLERGIES     Other    FAMILY HISTORY       Family History   Problem Relation Age of Onset    Asthma Mother     Cancer Father         SOCIAL HISTORY       Social History     Socioeconomic History    Marital status:      Spouse name: None    Number of children: None    Years of education: None    Highest education level: None   Occupational History    None   Social Needs    Financial resource strain: None    Food insecurity:     Worry: None     Inability: None    Transportation needs:     Medical: None     Non-medical: None   Tobacco Use    Smoking status: Never Smoker    Smokeless tobacco: Never Used   Substance and Sexual Activity    Alcohol use:  Yes     Alcohol/week: 1.2 oz     Types: 2 Shots of liquor per week    Drug use: Yes     Types: Marijuana     Comment: hemp oil now use to smoke marijuana    Sexual activity: Yes     Partners: Female   Lifestyle    Physical activity:     Days per week: None     Minutes per session: None    Stress: None   Relationships    Social connections:     Talks on phone: None     Gets together: None     Attends [employer] service: None     Active member of club or organization: None     Attends meetings of clubs or organizations: None     Relationship status: None    Intimate partner violence:     Fear of current or ex partner: None     Emotionally abused: None     Physically abused: None     Forced sexual activity: None   Other Topics Concern    None   Social History Narrative    None       SCREENINGS           PHYSICAL EXAM    (up to 7 for level 4, 8 or more for level 5)     ED Triage Vitals [05/06/19 1102] BP Temp Temp Source Pulse Resp SpO2 Height Weight   (!) 158/98 98.2 °F (36.8 °C) Oral 82 18 94 % 5' 11\" (1.803 m) 207 lb 7.3 oz (94.1 kg)       Physical Exam    General: Alert and awake ×3. Nontoxic appearance. Well-developed well-nourished 63-year-old male in moderate pain and distress. HEENT: Normocephalic atraumatic. Neck is tender. Most tenderness on the left side. Airway intact. No adenopathy. Is no swelling. She has a well-healed NaSal abrasion noted. Nothing requiring sutures. Cardiac: Regular rate and rhythm with no murmurs rubs or gallops. Chest wall is tender. No crepitus. Pulmonary: Lungs are clear in all lung fields. No wheezing. No Rales. Abdomen: Soft and nontender. Negative hepatosplenomegaly. Bowel sounds are active  Extremities: Moving all extremities. No calf tenderness. Peripheral pulses all intact  Skin: No skin lesions. No rashes. F knee without any deformity. Mild swelling noted. No abrasion or laceration. Left forearm has a healed abrasion. He already use peroxide and some antibiotic anti-septic on it already. Neurologic: Cranial nerves II through XII was grossly intact. Nonfocal neurological exam  Psychiatric: Patient is pleasant. Mood is appropriate. DIAGNOSTIC RESULTS     EKG (Per Emergency Physician):       RADIOLOGY (Per Emergency Physician): Interpretation per the Radiologist below, if available at the time of this note:  Xr Ribs Right Include Chest (min 3 Views)    Result Date: 5/6/2019  EXAMINATION: 4 XRAY VIEWS OF THE RIGHT RIBS WITH FRONTAL XRAY VIEW OF THE CHEST 5/6/2019 11:27 am COMPARISON: None. HISTORY: ORDERING SYSTEM PROVIDED HISTORY: injury TECHNOLOGIST PROVIDED HISTORY: Reason for exam:->injury Ordering Physician Provided Reason for Exam: fell 15 ft to groung yesterday pain posterior upper rt ribs Acuity: Acute Type of Exam: Initial FINDINGS: The cardiomediastinal silhouette is of normal size. The lungs are grossly clear.   There is no evidence of pleural effusion. There is no pneumothorax. There is no acute osseous abnormality. There is no acute right rib fracture. No acute cardiopulmonary disease. No evidence of acute right rib fracture. Xr Knee Left (1-2 Views)    Result Date: 5/6/2019  EXAMINATION: 2 XRAY VIEWS OF THE LEFT KNEE 5/6/2019 11:27 am COMPARISON: May 2, 2019 HISTORY: ORDERING SYSTEM PROVIDED HISTORY: Trauma TECHNOLOGIST PROVIDED HISTORY: Reason for exam:->Trauma Ordering Physician Provided Reason for Exam: fell 15 ft to ground yesterday cleaning leaves fr gutter Acuity: Acute Type of Exam: Initial FINDINGS: There is no acute fracture or dislocation of the left knee. There are mild to moderate tricompartmental degenerative changes, most pronounced in the lateral compartment. There is mild to moderate joint space narrowing in the lateral compartment. There is large suprapatellar joint effusion. The soft tissue is within normal limits. No radiopaque foreign body. No acute fracture or dislocation in the left knee. Mild to moderate tricompartmental degenerative changes, most pronounced in the lateral compartment. Large suprapatellar joint effusion. Ct Head Wo Contrast    Result Date: 5/6/2019  EXAMINATION: CT OF THE HEAD WITHOUT CONTRAST  5/6/2019 11:16 am TECHNIQUE: CT of the head was performed without the administration of intravenous contrast. Dose modulation, iterative reconstruction, and/or weight based adjustment of the mA/kV was utilized to reduce the radiation dose to as low as reasonably achievable. COMPARISON: None. HISTORY: ORDERING SYSTEM PROVIDED HISTORY: HEAD TRAUMA, CLOSED, MILD, GCS >= 13, NO RISK FACTORS, NEURO EXAM NORMAL TECHNOLOGIST PROVIDED HISTORY: Has a \"code stroke\" or \"stroke alert\" been called? ->No Ordering Physician Provided Reason for Exam: Fall (1900 yesterday, pt was cleaning gutters, fell 15 feet to ground.  Pt states event is \"blurry\" to him, but does not believe he lost consciousness. Acuity: Acute Type of Exam: Initial FINDINGS: BRAIN/VENTRICLES: There is no acute intracranial hemorrhage, mass effect or midline shift. No abnormal extra-axial fluid collection. The gray-white differentiation is maintained without evidence of an acute infarct. There is no evidence of hydrocephalus. ORBITS: The visualized portion of the orbits demonstrate no acute abnormality. SINUSES: Mucosal thickening. No acute fluid collections identified. SOFT TISSUES/SKULL:  No acute abnormality of the visualized skull or soft tissues. No acute intracranial abnormality. Ct Cervical Spine Wo Contrast    Result Date: 5/6/2019  EXAMINATION: CT OF THE CERVICAL SPINE WITHOUT CONTRAST 5/6/2019 11:16 am TECHNIQUE: CT of the cervical spine was performed without the administration of intravenous contrast. Multiplanar reformatted images are provided for review. Dose modulation, iterative reconstruction, and/or weight based adjustment of the mA/kV was utilized to reduce the radiation dose to as low as reasonably achievable. COMPARISON: None. HISTORY: ORDERING SYSTEM PROVIDED HISTORY: RECENT TRAUMA, SPINE TECHNOLOGIST PROVIDED HISTORY: Ordering Physician Provided Reason for Exam: Fall (1900 yesterday, pt was cleaning gutters, fell 15 feet to ground. Pt states event is \"blurry\" to him, but does not believe he lost consciousness. Acuity: Acute Type of Exam: Initial FINDINGS: BONES/ALIGNMENT: Evaluation of C7 and T1 levels limited by body habitus. Minimal superior endplate height loss and irregularity of T1 vertebral body is suspected although poorly detailed due to artifact. There is normal alignment of the cervical spine. There are number of lucent lesions in the cervical spine. Suspect fracture through the left occipital condyle. There is irregularity of the left lateral mass. DEGENERATIVE CHANGES: Mild disc space narrowing without significant facet hypertrophy. No osseous spinal canal stenosis.  SOFT atelectasis. There is some respiratory motion artifact. Dense calcified subcarinal and left hilar lymph nodes partially included. Gallstones are seen within the gallbladder also partially included. There is minimal loss of height, T1 vertebral body anterior superior endplate and T2 vertebral body inferior endplate, both of indeterminate age. If there is localized pain or tenderness to this region, MRI would be recommended, particularly given the stated mechanism of injury. Cholelithiasis. Ct Lumbar Spine Wo Contrast    Result Date: 5/6/2019  EXAMINATION: CT OF THE LUMBAR SPINE WITHOUT CONTRAST  5/6/2019 TECHNIQUE: CT of the lumbar spine was performed without the administration of intravenous contrast. Multiplanar reformatted images are provided for review. Dose modulation, iterative reconstruction, and/or weight based adjustment of the mA/kV was utilized to reduce the radiation dose to as low as reasonably achievable. COMPARISON: None HISTORY: ORDERING SYSTEM PROVIDED HISTORY: Fall TECHNOLOGIST PROVIDED HISTORY: Reason for exam:->Fall Ordering Physician Provided Reason for Exam: , pt was cleaning gutters, fell 15 feet to ground. Acuity: Acute Type of Exam: Initial FINDINGS: BONES/ALIGNMENT: There is normal alignment of the spine. The vertebral body heights are maintained. No osseous destructive lesion is seen. DEGENERATIVE CHANGES: Multilevel degenerative disc disease with disc bulging at L4-L5 and L5-S1. SOFT TISSUES/RETROPERITONEUM: No paraspinal mass is seen. 1. No acute lumbar spine abnormality 2. Multilevel degenerative changes with disc bulging at L4-L5 and L5-S1     Cta Neck W Contrast    Result Date: 5/6/2019  EXAMINATION: CTA OF THE NECK 5/6/2019 1:27 pm TECHNIQUE: CTA of the neck was performed with the administration of intravenous contrast. Multiplanar reformatted images are provided for review. MIP images are provided for review.  Stenosis of the internal carotid arteries measured using NASCET criteria. Dose modulation, iterative reconstruction, and/or weight based adjustment of the mA/kV was utilized to reduce the radiation dose to as low as reasonably achievable. COMPARISON: CT cervical spine May 6, 2019 HISTORY: ORDERING SYSTEM PROVIDED HISTORY: Injury TECHNOLOGIST PROVIDED HISTORY: Has a \"code stroke\" or \"stroke alert\" been called? ->No Ordering Physician Provided Reason for Exam: injury, fu cspine fx, Acuity: Acute Type of Exam: Initial FINDINGS: AORTIC ARCH/ARCH VESSELS: There is a normal branch pattern of the aortic arch. No significant stenosis is seen of the innominate artery or subclavian arteries. CAROTID ARTERIES: The common carotid arteries are normal in appearance without evidence of a flow limiting stenosis. The internal carotid arteries are normal in appearance without evidence of a flow limiting stenosis by NASCET criteria. No dissection or arterial injury is seen. VERTEBRAL ARTERIES: The vertebral arteries both arise from the subclavian arteries and are normal in caliber without evidence of flow limiting stenosis or dissection. SOFT TISSUES:  The lung apices are clear. There are nonenlarged mediastinal and hilar lymph nodes, likely reactive. There is mild prominence of the bilateral palatine and lingual tonsils, likely reactive. The parotid, submandibular and thyroid glands demonstrate no acute abnormality. BONES: There are known fractures of the left occipital condyle and possibly the left lateral mass of C1. No acute abnormality or flow-limiting stenosis of the major arteries of the neck. Known fractures of the left occipital condyle and possibly the left lateral mass of C1.        ED BEDSIDE ULTRASOUND:   Performed by ED Physician - none    LABS:  Labs Reviewed   CBC WITH AUTO DIFFERENTIAL - Abnormal; Notable for the following components:       Result Value    WBC 11.9 (*)     Hemoglobin 17.7 (*)     All other components within normal limits    Narrative:     Performed at:  [facility]  [address] [place] [facility] [address]   Phone [phone]   COMPREHENSIVE METABOLIC PANEL W/ REFLEX TO MG FOR LOW K - Abnormal; Notable for the following components:    Sodium 132 (*)     CO2 19 (*)     Glucose 105 (*)     CREATININE 0.7 (*)     Albumin/Globulin Ratio 0.7 (*)     ALT 97 (*)     AST 81 (*)     All other components within normal limits    Narrative:     Performed at:  [facility]  [address] [facility] [address]   Phone [phone]   PROTIME-INR    Narrative:     Performed at:  [facility]  [address] [facility] [address]   Phone [phone]   APTT    Narrative:     Performed at:  [facility]  [address] [facility] [address]   Phone [phone]   URINALYSIS    Narrative:     Performed at:  [facility]  [address] [facility] [employer] [address]   Phone [phone]        All other labs were within normal range or not returned as of this dictation. Procedures      EMERGENCY DEPARTMENT COURSE and DIFFERENTIAL DIAGNOSIS/MDM:   Vitals:    Vitals:    05/06/19 1215 05/06/19 1245 05/06/19 1300 05/06/19 1345   BP: (!) 153/86 (!) 145/77 137/82 136/78   Pulse: 76 72 70 72   Resp: 17   18   Temp:       TempSrc:       SpO2: 95% 90% 92% 96%   Weight:       Height:           Medications   morphine (PF) injection 4 mg (4 mg Intravenous Given 5/6/19 1235)   ondansetron (ZOFRAN) injection 4 mg (4 mg Intravenous Given 5/6/19 1234)   iopamidol (ISOVUE-370) 76 % injection 75 mL (75 mLs Intravenous Given 5/6/19 1331)       MDM. This is a 66-year-old male who was working on his roof 2 stories up fell yesterday around 7 PM landed on his head. There was no loss consciousness. Patient some Tylenol went to bed. Woke up this morning and drove himself here for evaluation.   Patient is complaining only of neck pain rib pain and left knee pain. On evaluation of the CT scan of the cervical spine has a condylar fracture minimally displaced as well as a C1 lateral mass fracture. We will obtain some T thoracic C-spine as well as lumbar spine as well. Will consult neurosurgery. Spoke with the nurse practitioner Karri Gee CNP. Surgery consultation and does not think this is surgical.  Will follow-up in 4 weeks. Patient in the meantime will be placed on Percocets and Flexeril. He will be placed in a c-collar for 4 weeks. Patient understands the situation. Patient will be discharged. REVAL:         CRITICAL CARE TIME   Total CriticalCare time was 0 minutes, excluding separately reportable procedures. There was a high probability of clinically significant/life threatening deterioration in the patient's condition which required my urgent intervention. CONSULTS:  IP CONSULT TO NEUROSURGERY    PROCEDURES:  Unless otherwise noted below, none     [unfilled]    FINAL IMPRESSION      1. Closed displaced fracture of first cervical vertebra, unspecified fracture morphology, initial encounter [facility])          DISPOSITION/PLAN   DISPOSITION        PATIENT REFERRED TO:  Mena Elliott MD  [address] [address]  [phone]    Schedule an appointment as soon as possible for a visit in 4 weeks  for follow up regarding occipital condyle fracture      DISCHARGE MEDICATIONS:  New Prescriptions    CYCLOBENZAPRINE (FLEXERIL) 10 MG TABLET    Take 1 tablet by mouth nightly as needed for Muscle spasms    OXYCODONE-ACETAMINOPHEN (PERCOCET) 5-325 MG PER TABLET    Take 1 tablet by mouth every 6 hours as needed for Pain for up to 7 days. Intended supply: 3 days.  Take lowest dose possible to manage pain          (Please note:  Portions of this note were completed with a voice recognition program.Efforts were made to edit the dictations but occasionally words and phrases are

## 2019-05-06 NOTE — ED NOTES
AVS reviewed, IV removed, VSS, pt verbalized understanding of f/u care and d/c instructions. Pt aware of the importance of keeping his c-collar on 24/7 x4 weeks until he sees his neurosurgeon. Pt verbalized understanding. Pt also aware of safety with his pain medications at home. Pt's mother taking him home.       Juanis Ortega RN  05/06/19 5589

## 2019-05-06 NOTE — PROGRESS NOTES
NEUROSURGERY PROGRESS NOTE    Flora Bahena  [id number]   1960   5/6/2019    ED physician: Dr. Cassie Alpers    Reason for call: Occipital condyle fracture    History of present illness: Patient is a 61 y.o. male w/ PMH of NHL, Hep C and arthritis who presented on 5/6/2019 to [facility] ED with neck pain s/p fall from roof yesterday. According to Dr. Herbie Thomson, the patient was was working on his roof when he fell off. Patient landed on the ground head first and also landed on the left knee. Patient has history of knee pain and right chest wall pain. He thinks he might have broken a rib. He states this happened last night around 7 PM.  He was able to get up and took some Tylenol. He then went to bed and woke up this morning still having pain and drove himself to the emergency department for evaluation. Patient denies any numbness. There is no focal weakness. No nausea vomiting. Patient has no blurred vision. Physical Exam:     BP (!) 153/86   Pulse 76   Temp 98.2 °F (36.8 °C) (Oral)   Resp (!) 5   Ht 5' 11\" (1.803 m)   Wt 207 lb 7.3 oz (94.1 kg)   SpO2 95%   BMI 28.93 kg/m²     GCS per ED physician:  4 - Opens eyes on own  5 - Alert and oriented  6 - Follows simple motor commands    Radiological Findings:  Ct Head Wo Contrast  Result Date: 5/6/2019  No acute intracranial abnormality. Ct Cervical Spine Wo Contrast  Result Date: 5/6/2019  Minimally displaced left occipital condyle fracture. Suspect small fracture of the left lateral mass of C1. Suspect age-indeterminate fracture of T1 superior endplate with minimal height loss and no bone retropulsion. Indeterminate small lucent lesions in the cervical spine, which may reflect benign intraosseous hemangiomas. Consider further evaluation with MRI. Ct Thoracic Spine Wo Contrast  Result Date: 5/6/2019  There is minimal loss of height, T1 vertebral body anterior superior endplate and T2 vertebral body inferior endplate, both of indeterminate age.   If

## 2019-05-07 ENCOUNTER — OFFICE VISIT (OUTPATIENT)
Dept: INTERNAL MEDICINE CLINIC | Age: 59
End: 2019-05-07
Payer: MEDICARE

## 2019-05-07 VITALS
OXYGEN SATURATION: 91 % | WEIGHT: 205 LBS | DIASTOLIC BLOOD PRESSURE: 78 MMHG | BODY MASS INDEX: 28.7 KG/M2 | TEMPERATURE: 97.7 F | SYSTOLIC BLOOD PRESSURE: 118 MMHG | HEART RATE: 76 BPM | RESPIRATION RATE: 12 BRPM | HEIGHT: 71 IN

## 2019-05-07 DIAGNOSIS — J45.20 MILD INTERMITTENT ASTHMA WITHOUT COMPLICATION: ICD-10-CM

## 2019-05-07 DIAGNOSIS — E78.2 MIXED HYPERLIPIDEMIA: Primary | ICD-10-CM

## 2019-05-07 DIAGNOSIS — S12.000A CLOSED DISPLACED FRACTURE OF FIRST CERVICAL VERTEBRA, UNSPECIFIED FRACTURE MORPHOLOGY, INITIAL ENCOUNTER (HCC): ICD-10-CM

## 2019-05-07 DIAGNOSIS — M17.12 OSTEOARTHRITIS OF LEFT KNEE, UNSPECIFIED OSTEOARTHRITIS TYPE: ICD-10-CM

## 2019-05-07 DIAGNOSIS — Z12.5 PROSTATE CANCER SCREENING: ICD-10-CM

## 2019-05-07 DIAGNOSIS — J30.2 SEASONAL ALLERGIES: ICD-10-CM

## 2019-05-07 LAB
A/G RATIO: 1.1 (ref 1.1–2.2)
ALBUMIN SERPL-MCNC: 4.1 G/DL (ref 3.4–5)
ALP BLD-CCNC: 85 U/L (ref 40–129)
ALT SERPL-CCNC: 107 U/L (ref 10–40)
ANION GAP SERPL CALCULATED.3IONS-SCNC: 14 MMOL/L (ref 3–16)
AST SERPL-CCNC: 82 U/L (ref 15–37)
BILIRUB SERPL-MCNC: 1 MG/DL (ref 0–1)
BUN BLDV-MCNC: 15 MG/DL (ref 7–20)
CALCIUM SERPL-MCNC: 9.6 MG/DL (ref 8.3–10.6)
CHLORIDE BLD-SCNC: 102 MMOL/L (ref 99–110)
CHOLESTEROL, TOTAL: 187 MG/DL (ref 0–199)
CO2: 24 MMOL/L (ref 21–32)
CREAT SERPL-MCNC: 1.1 MG/DL (ref 0.9–1.3)
GFR AFRICAN AMERICAN: >60
GFR NON-AFRICAN AMERICAN: >60
GLOBULIN: 3.8 G/DL
GLUCOSE BLD-MCNC: 104 MG/DL (ref 70–99)
HDLC SERPL-MCNC: 49 MG/DL (ref 40–60)
LDL CHOLESTEROL CALCULATED: 117 MG/DL
POTASSIUM SERPL-SCNC: 4.8 MMOL/L (ref 3.5–5.1)
PROSTATE SPECIFIC ANTIGEN: 0.42 NG/ML (ref 0–4)
SODIUM BLD-SCNC: 140 MMOL/L (ref 136–145)
TOTAL PROTEIN: 7.9 G/DL (ref 6.4–8.2)
TRIGL SERPL-MCNC: 105 MG/DL (ref 0–150)
VLDLC SERPL CALC-MCNC: 21 MG/DL

## 2019-05-07 PROCEDURE — 3017F COLORECTAL CA SCREEN DOC REV: CPT | Performed by: NURSE PRACTITIONER

## 2019-05-07 PROCEDURE — 36415 COLL VENOUS BLD VENIPUNCTURE: CPT | Performed by: NURSE PRACTITIONER

## 2019-05-07 PROCEDURE — 99205 OFFICE O/P NEW HI 60 MIN: CPT | Performed by: NURSE PRACTITIONER

## 2019-05-07 PROCEDURE — 1036F TOBACCO NON-USER: CPT | Performed by: NURSE PRACTITIONER

## 2019-05-07 PROCEDURE — G8419 CALC BMI OUT NRM PARAM NOF/U: HCPCS | Performed by: NURSE PRACTITIONER

## 2019-05-07 PROCEDURE — G8427 DOCREV CUR MEDS BY ELIG CLIN: HCPCS | Performed by: NURSE PRACTITIONER

## 2019-05-07 RX ORDER — BUDESONIDE AND FORMOTEROL FUMARATE DIHYDRATE 160; 4.5 UG/1; UG/1
2 AEROSOL RESPIRATORY (INHALATION) 2 TIMES DAILY
Qty: 1 INHALER | Refills: 5 | Status: SHIPPED | OUTPATIENT
Start: 2019-05-07

## 2019-05-07 RX ORDER — LORATADINE 10 MG/1
10 TABLET ORAL DAILY
Qty: 30 TABLET | Refills: 3 | Status: SHIPPED | OUTPATIENT
Start: 2019-05-07 | End: 2019-07-01 | Stop reason: ALTCHOICE

## 2019-05-07 RX ORDER — ALBUTEROL SULFATE 90 UG/1
AEROSOL, METERED RESPIRATORY (INHALATION)
Qty: 1 INHALER | Refills: 3 | Status: SHIPPED | OUTPATIENT
Start: 2019-05-07

## 2019-05-07 ASSESSMENT — ENCOUNTER SYMPTOMS
RHINORRHEA: 0
WHEEZING: 1
COUGH: 0
SORE THROAT: 0
EYE PAIN: 0
NAUSEA: 0
ABDOMINAL PAIN: 0
COLOR CHANGE: 0
FREQUENT THROAT CLEARING: 0
DIFFICULTY BREATHING: 0
HOARSE VOICE: 0
CONSTIPATION: 0
SHORTNESS OF BREATH: 0
BACK PAIN: 0
HEARTBURN: 0
PHOTOPHOBIA: 0
DIARRHEA: 0
CHOKING: 0
HEMOPTYSIS: 0
CHEST TIGHTNESS: 0
TROUBLE SWALLOWING: 0
VOMITING: 0
SPUTUM PRODUCTION: 0

## 2019-05-07 NOTE — PROGRESS NOTES
fall. Large Suprapatellar joint effusion noted on xray. Patient of Dr. Gisele Johnson as OP, patient instructed to f/u today for status of pending surgery. Reports on pain medications given in ER. Will monitor. CT lumbar Spine w/o Contrast 05/06/2019  1. No acute lumbar spine abnormality   2. Multilevel degenerative changes with disc bulging at L4-L5 and L5-S1   Xray Ribs, right 05/06/2019  No acute cardiopulmonary disease.      Xray Left Knee 05/06/2019  No acute fracture or dislocation in the left knee.       Mild to moderate tricompartmental degenerative changes, most pronounced in   the lateral compartment.       Large suprapatellar joint effusion. No evidence of acute right rib fracture. CT Thoracic Spine 05/06/2019  There is minimal loss of height, T1 vertebral body anterior superior endplate   and T2 vertebral body inferior endplate, both of indeterminate age. Kwadwo Massa there   is localized pain or tenderness to this region, MRI would be recommended,   particularly given the stated mechanism of injury.       Cholelithiasis. CTA Neck w/IV Contrast 05/06/2019  No acute abnormality or flow-limiting stenosis of the major arteries of the   neck.       Known fractures of the left occipital condyle and possibly the left lateral   mass of C1.       Borderline sized cervical lymph nodes, particularly at the left level 1B   measuring to 1.1 x 1.2 cm, likely reactive.  Follow-up is recommended to   ensure resolution exclude neoplastic process. CT Head w/o IV contrast 05/06/2019  No acute intracranial abnormality.     /78 (Site: Left Upper Arm, Position: Sitting, Cuff Size: Large Adult)   Pulse 76   Temp 97.7 °F (36.5 °C) (Oral)   Resp 12   Ht 5' 11\" (1.803 m) Comment: shoes on  Wt 205 lb (93 kg) Comment: shoes on  SpO2 91%   BMI 28.59 kg/m²     Past Medical History:   Diagnosis Date    Arthritis     Asthma     Asthmatic bronchitis    Cancer ([address] 23 years ago    non-hodgkins lymphoma--5 weeks of radiation    Chronic pain of left knee 6/23/2016    Environmental and seasonal allergies 06/23/2016    cats/dogs/mold/milk    Hepatitis C     Moderate persistent asthma without complication 1/61/7103       Past Surgical History:   Procedure Laterality Date    APPENDECTOMY      KNEE ARTHROSCOPY Left 09/13/2016    L knee video arthroscopy with partial lateral menisectomy and plica resection    LYMPH NODE BIOPSY      lymph node in groin area    SHOULDER SURGERY Left 1978    reconstruction of left shoulder        Allergies   Allergen Reactions    Other Shortness Of Breath     Dogs/cats/mold/dust mite/cows milk--watery eyes/ sneezing       Outpatient Medications Marked as Taking for the 5/7/19 encounter (Office Visit) with JUANITA Cazares - CNP   Medication Sig Dispense Refill    oxyCODONE-acetaminophen (PERCOCET) 5-325 MG per tablet Take 1 tablet by mouth every 6 hours as needed for Pain for up to 7 days. Intended supply: 3 days. Take lowest dose possible to manage pain 20 tablet 0    cyclobenzaprine (FLEXERIL) 10 MG tablet Take 1 tablet by mouth nightly as needed for Muscle spasms 30 tablet 0       Family History   Problem Relation Age of Onset    Asthma Mother     Cancer Father        Social History     Socioeconomic History    Marital status:      Spouse name: Not on file    Number of children: Not on file    Years of education: Not on file    Highest education level: Not on file   Occupational History    Not on file   Social Needs    Financial resource strain: Not on file    Food insecurity:     Worry: Not on file     Inability: Not on file    Transportation needs:     Medical: Not on file     Non-medical: Not on file   Tobacco Use    Smoking status: Never Smoker    Smokeless tobacco: Never Used   Substance and Sexual Activity    Alcohol use:  Yes     Alcohol/week: 1.2 oz     Types: 2 Shots of liquor per week    Drug use: Yes     Types: Marijuana     Comment: hemp oil now use to smoke marijuana    Sexual activity: Yes     Partners: Female   Lifestyle    Physical activity:     Days per week: Not on file     Minutes per session: Not on file    Stress: Not on file   Relationships    Social connections:     Talks on phone: Not on file     Gets together: Not on file     Attends [employer] service: Not on file     Active member of club or organization: Not on file     Attends meetings of clubs or organizations: Not on file     Relationship status: Not on file    Intimate partner violence:     Fear of current or ex partner: Not on file     Emotionally abused: Not on file     Physically abused: Not on file     Forced sexual activity: Not on file   Other Topics Concern    Not on file   Social History Narrative    Not on file       Review of Systems   Constitutional: Negative for activity change, appetite change, fatigue, fever, malaise/fatigue, unexpected weight change and weight loss. HENT: Negative for congestion, ear pain, hoarse voice, postnasal drip, rhinorrhea, sneezing, sore throat and trouble swallowing. Eyes: Negative for photophobia and pain. Respiratory: Positive for wheezing. Negative for cough, hemoptysis, sputum production, choking, chest tightness and shortness of breath. Cardiovascular: Negative for chest pain, dyspnea on exertion, palpitations, leg swelling and PND. Gastrointestinal: Negative for abdominal pain, constipation, diarrhea, heartburn, nausea and vomiting. Genitourinary: Negative for difficulty urinating and hematuria. Musculoskeletal: Positive for neck pain and neck stiffness (2/2 to trauma, see CT's as outlined above ). Negative for back pain and myalgias. Skin: Negative for color change, pallor and rash. Neurological: Negative for dizziness, tremors, syncope, weakness, numbness and headaches. Psychiatric/Behavioral: Negative for agitation, confusion, hallucinations, self-injury, sleep disturbance and suicidal ideas.  The patient is not nervous/anxious and is not hyperactive. Physical Exam   Nursing note reviewed  /78 (Site: Left Upper Arm, Position: Sitting, Cuff Size: Large Adult)   Pulse 76   Temp 97.7 °F (36.5 °C) (Oral)   Resp 12   Ht 5' 11\" (1.803 m) Comment: shoes on  Wt 205 lb (93 kg) Comment: shoes on  SpO2 91%   BMI 28.59 kg/m²   BP Readings from Last 3 Encounters:   05/07/19 118/78   05/06/19 (!) 141/70   05/16/18 134/87     Wt Readings from Last 3 Encounters:   05/07/19 205 lb (93 kg)   05/06/19 207 lb 7.3 oz (94.1 kg)   05/02/19 200 lb (90.7 kg)      Body mass index is 28.59 kg/m². No results found for this visit on 05/07/19.    Lab Review   Admission on 05/06/2019, Discharged on 05/06/2019   Component Date Value    WBC 05/06/2019 11.9*    RBC 05/06/2019 5.21     Hemoglobin 05/06/2019 17.7*    Hematocrit 05/06/2019 51.6     MCV 05/06/2019 99.2     MCH 05/06/2019 34.0     MCHC 05/06/2019 34.3     RDW 05/06/2019 14.1     Platelets 54/01/1526 198     MPV 05/06/2019 9.0     Neutrophils % 05/06/2019 60.8     Lymphocytes % 05/06/2019 24.8     Monocytes % 05/06/2019 10.3     Eosinophils % 05/06/2019 3.3     Basophils % 05/06/2019 0.8     Neutrophils # 05/06/2019 7.3     Lymphocytes # 05/06/2019 3.0     Monocytes # 05/06/2019 1.2     Eosinophils # 05/06/2019 0.4     Basophils # 05/06/2019 0.1     Sodium 05/06/2019 132*    Potassium reflex Magnesi* 05/06/2019 4.3     Chloride 05/06/2019 103     CO2 05/06/2019 19*    Anion Gap 05/06/2019 10     Glucose 05/06/2019 105*    BUN 05/06/2019 10     CREATININE 05/06/2019 0.7*    GFR Non- 05/06/2019 >60     GFR  05/06/2019 >60     Calcium 05/06/2019 9.0     Total Protein 05/06/2019 8.0     Alb 05/06/2019 3.4     Albumin/Globulin Ratio 05/06/2019 0.7*    Total Bilirubin 05/06/2019 0.8     Alkaline Phosphatase 05/06/2019 78     ALT 05/06/2019 97*    AST 05/06/2019 81*    Globulin 05/06/2019 4.6     Protime well-developed and well-nourished. HENT:   Head: Normocephalic. Nose: Right sinus exhibits no maxillary sinus tenderness and no frontal sinus tenderness. Left sinus exhibits no maxillary sinus tenderness and no frontal sinus tenderness. Mouth/Throat: Mucous membranes are normal. No oropharyngeal exudate, posterior oropharyngeal edema, posterior oropharyngeal erythema or tonsillar abscesses. Eyes: Pupils are equal, round, and reactive to light. Cardiovascular: Normal rate and regular rhythm. No murmur heard. Pulmonary/Chest: Effort normal. No respiratory distress. He has wheezes. He has no rales. He exhibits no tenderness. Abdominal: Soft. Bowel sounds are normal. He exhibits no distension and no mass. There is no tenderness. There is no rebound and no guarding. Musculoskeletal: He exhibits edema (to left knee, 2/2 to trauma ) and tenderness (to neck 2/2 to fall ). Cervical back: He exhibits tenderness, swelling, pain and spasm. Lymphadenopathy:     He has cervical adenopathy (noted on CT as outlined above ). Neurological: He is alert and oriented to person, place, and time. He has normal reflexes. No cranial nerve deficit. Skin: Skin is warm and dry. No rash noted. No erythema. Psychiatric: He has a normal mood and affect. His behavior is normal.        Assessment/Plan   Franklin Meadows was seen today for established new doctor, other and medication refill. Diagnoses and all orders for this visit:    Closed displaced fracture of first cervical vertebra, unspecified fracture morphology, [facility]  -     Marilee Irvin MD, Neurosurgery, [facility]  -PRN pain medications given per ER, will give urgent referral, appreciate input     Mixed hyperlipidemia, stable   -     LIPID PANEL; Future  -     COMPREHENSIVE METABOLIC PANEL; Future    Prostate cancer screening  -     PSA PROSTATIC SPECIFIC ANTIGEN;  Future    Seasonal allergies, stable  -     loratadine (CLARITIN)

## 2019-05-08 ENCOUNTER — OFFICE VISIT (OUTPATIENT)
Dept: ORTHOPEDIC SURGERY | Age: 59
End: 2019-05-08
Payer: MEDICARE

## 2019-05-08 VITALS — TEMPERATURE: 98.4 F | SYSTOLIC BLOOD PRESSURE: 125 MMHG | HEART RATE: 105 BPM | DIASTOLIC BLOOD PRESSURE: 90 MMHG

## 2019-05-08 DIAGNOSIS — S83.412A SPRAIN OF MEDIAL COLLATERAL LIGAMENT OF LEFT KNEE, INITIAL ENCOUNTER: ICD-10-CM

## 2019-05-08 DIAGNOSIS — R74.01 ELEVATED AST (SGOT): Primary | ICD-10-CM

## 2019-05-08 DIAGNOSIS — M17.12 ARTHRITIS OF LEFT KNEE: Primary | ICD-10-CM

## 2019-05-08 DIAGNOSIS — R74.01 ELEVATED AST (SGOT): ICD-10-CM

## 2019-05-08 PROCEDURE — 20610 DRAIN/INJ JOINT/BURSA W/O US: CPT | Performed by: PHYSICIAN ASSISTANT

## 2019-05-08 PROCEDURE — 1036F TOBACCO NON-USER: CPT | Performed by: PHYSICIAN ASSISTANT

## 2019-05-08 PROCEDURE — 3017F COLORECTAL CA SCREEN DOC REV: CPT | Performed by: PHYSICIAN ASSISTANT

## 2019-05-08 PROCEDURE — L1810 KO ELASTIC WITH JOINTS: HCPCS | Performed by: PHYSICIAN ASSISTANT

## 2019-05-08 PROCEDURE — 99213 OFFICE O/P EST LOW 20 MIN: CPT | Performed by: PHYSICIAN ASSISTANT

## 2019-05-08 PROCEDURE — G8427 DOCREV CUR MEDS BY ELIG CLIN: HCPCS | Performed by: PHYSICIAN ASSISTANT

## 2019-05-08 PROCEDURE — G8419 CALC BMI OUT NRM PARAM NOF/U: HCPCS | Performed by: PHYSICIAN ASSISTANT

## 2019-05-09 PROBLEM — S83.412A SPRAIN OF MEDIAL COLLATERAL LIGAMENT OF LEFT KNEE: Status: ACTIVE | Noted: 2019-05-09

## 2019-05-09 NOTE — PROGRESS NOTES
touch.  Motor testing  5/5 in all major motor groups of the lower extremities. Gait is normal heel to toe. Gait is antalgic. Negative Matias's Sign. SLR negative. Examination of the lower extremities shows intact perfusion to all extremities. No cyanosis. Digits are warm to touch, capillary refill is less than 2 seconds. There is mild edema noted. Examination of the skin over both lower extremities reveals: The skin to be intact without lacerations or abrasions. No significant erythema. No rashes or skin lesions. X Rays: performed in the office today:   AP Standing, Lateral and Sunrise views of left knee: There is moderate to advanced osteoarthritic findings of the left knee noted with grade 3 arthritic changes of the lateral compartment. Grade 3 changes of the patellofemoral compartment. Grade 1 changes of the medial compartment. No acute fractures or dislocations noted. Diagnosis:        ICD-10-CM    1. Arthritis of left knee M17.12 XR KNEE LEFT (3 VIEWS)     NM ARTHROCENTESIS ASPIR&/INJ MAJOR JT/BURSA W/O US     NM TRIAMCINOLONE ACETONIDE INJ   2. Sprain of medial collateral ligament of left knee, initial encounter S83.412A Economy Hinged Knee Breg Brace        Assessment/ Plan:      He has advanced arthritis of left knee and the lateral compartment. He has tenderness over the course of the MCL consistent with a mild MCL sprain, no laxity. He is requesting a cortisone injection for his left knee pain. This may help some of his lateral compartment and anterior compartment discomfort but I doubt this is going to have much medial pain relief. The natural history of the patient's diagnosis as well as the treatment options were discussed in full and questions were answered. Risks and benefits of the treatment options also reviewed in detail. Risk and benefits of corticosteroid intra-articular injection was discussed today. All questions were answered to his satisfaction. He verbally consented to proceed with intra-articular injection today. Cortisone Injection                                                       PROCEDURE NOTE:     Pre op Diagnosis:  left knee pain     Post op Diagnosis: Same  With the patient's permission, his left knee was prepped  in standard sterile fashion with  Alcohol and 2 cc of 0.25% Marcaine and 1 cc of Kenalog 40 mg was injected into the left lateral compartment  without difficulty. The patient tolerated this well without difficulty. A band-aid was applied. The patient was advised to ice the knee for 15-20 minutes to relieve any injection site related pain. Recommending hinged knee brace for his MCL sprain. Procedures            Economy Hinged Knee Breg Brace     Patient was prescribed a Breg Economy Hinged Knee Brace. The left knee will require stabilization / immobilization from this semi-rigid / rigid orthosis to improve their function. The orthosis will assist in protecting the affected area, provide functional support and facilitate healing. The patient was educated and fit by a healthcare professional with expert knowledge and specialization in brace application while under the direct supervision of the treating physician. Verbal and written instructions for the use of and application of this item were provided. They were instructed to contact the office immediately should the brace result in increased pain, decreased sensation, increased swelling or worsening of the condition. Follow Up: 4-6 weeks. Call or return to clinic prn if these symptoms worsen or fail to improve as anticipated.

## 2019-05-10 ENCOUNTER — TELEPHONE (OUTPATIENT)
Dept: INTERNAL MEDICINE CLINIC | Age: 59
End: 2019-05-10

## 2019-05-10 LAB
HAV IGM SER IA-ACNC: ABNORMAL
HEPATITIS B CORE IGM ANTIBODY: ABNORMAL
HEPATITIS B SURFACE ANTIGEN INTERPRETATION: ABNORMAL
HEPATITIS C ANTIBODY INTERPRETATION: REACTIVE

## 2019-05-10 NOTE — TELEPHONE ENCOUNTER
Pt got #20 of percocet on Monday at ER. Pt is requesting more until his appt with Dr Root Client May 14.

## 2019-05-12 NOTE — PROGRESS NOTES
Patient is a 51-year-old male we follow for painful left knee. His knee pain is significantly increased in the last 3-4 months. He complains of pain with prolonged sitting and driving and he is unable to walk after this for some time. He rates his pain at 7 out of 10. He now states he has trouble sleeping and is developing sharp pains in his knee. He complains of anterior shin pain and difficulty going up and down steps and rising from a chair. He complains of knock-knee or valgus deformity of his knee and loss of range of motion. He is status post arthroscopic evaluation performed by myself on September 2016. At that time we saw full-thickness loss of the cartilage space of the lateral tibial surface. The patient states he is not diabetic does not smoke and unfortunately now is taking oral narcotics for some type of pain management. This is being given to him by another doctor. .  Patient states he has no allergies to metal and does not have any back problems. He's here to discuss further care and management of his painful left knee. Patient Active Problem List   Diagnosis    Moderate persistent asthma without complication    Environmental and seasonal allergies    Chronic pain of left knee    Laryngopharyngeal reflux disease    Hoarse voice quality    Arthritis of left knee    Right rotator cuff tendonitis    Sprain of medial collateral ligament of left knee     Prior to Visit Medications    Medication Sig Taking?  Authorizing Provider   loratadine (CLARITIN) 10 MG tablet Take 1 tablet by mouth daily  JUANITA Fay CNP   budesonide-formoterol (SYMBICORT) 160-4.5 MCG/ACT AERO Inhale 2 puffs into the lungs 2 times daily  JUANITA Arriaga CNP   albuterol sulfate HFA (VENTOLIN HFA) 108 (90 Base) MCG/ACT inhaler INHALE TWO PUFFS BY MOUTH EVERY 6 HOURS AS NEEDED FOR WHEEZING  Anny De La O, JUANITA Moreira CNP   oxyCODONE-acetaminophen (PERCOCET) 5-325 MG per tablet Take 1 tablet by mouth every 6 hours as needed for Pain for up to 7 days. Intended supply: 3 days. Take lowest dose possible to manage pain  Robyn Chandler MD   cyclobenzaprine (FLEXERIL) 10 MG tablet Take 1 tablet by mouth nightly as needed for Muscle spasms  Robyn Chandler MD       Physical examination 59-year-old male oriented ×3 temperature is 98.4. Examination of his back shows mild decreased range of motion but no specific pain tenderness or instability. Motor exam to the left lower extremity shows quadriceps hamstrings hip abductors and abductors foot plantar dorsiflexors are intact 4-5 over 5. Sensation and perfusion are intact to the left foot and ankle. There is no numbness or tingling noted in the left lower extremity. Deep tendon reflexes are 0 at the knee and 0 to ankle of the left lower extremity. No other obvious cutaneous lesions lymphedema or cellulitic processes are noted in the left lower extremity. Examination of the left knee shows loss of full extension by 5° to 130° of flexion noted. Lateral tenderness to palpation and overall valgus orientation of his knee when he stands. No obvious signs of instability or deep sepsis are noted in the left knee. X-rays obtained AP lateral patellofemoral view of the left knee shows lateral near bone-on-bone degenerative osteoarthritis. Moderate patellofemoral degenerative joint disease is also seen. No other obvious fractures tumors or dislocations are noted on these x-rays. Impression a 59-year-old male with degenerative osteoarthritis with complete lateral tibial and femoral cartilage loss noted by arthroscopy 3 years ago. We had a 35 minute face-to-face discussion of which greater than 50% of the time was spent in counseling with this patient concerning Left uncemented total knee arthroplasty it's preoperative evaluation and its postoperative pain management and follow-up.   We went over the surgical procedure risks and complications

## 2019-05-14 ENCOUNTER — TELEPHONE (OUTPATIENT)
Dept: ORTHOPEDIC SURGERY | Age: 59
End: 2019-05-14

## 2019-05-14 ENCOUNTER — OFFICE VISIT (OUTPATIENT)
Dept: ORTHOPEDIC SURGERY | Age: 59
End: 2019-05-14
Payer: MEDICARE

## 2019-05-14 VITALS
HEIGHT: 71 IN | HEART RATE: 82 BPM | SYSTOLIC BLOOD PRESSURE: 122 MMHG | BODY MASS INDEX: 28.7 KG/M2 | WEIGHT: 205 LBS | DIASTOLIC BLOOD PRESSURE: 75 MMHG

## 2019-05-14 DIAGNOSIS — S02.113D CLOSED FRACTURE OF LEFT OCCIPITAL CONDYLE WITH ROUTINE HEALING: Primary | ICD-10-CM

## 2019-05-14 DIAGNOSIS — M48.54XA: ICD-10-CM

## 2019-05-14 PROCEDURE — L0172 CERV COL SR FOAM 2PC PRE OTS: HCPCS | Performed by: ORTHOPAEDIC SURGERY

## 2019-05-14 PROCEDURE — G8419 CALC BMI OUT NRM PARAM NOF/U: HCPCS | Performed by: ORTHOPAEDIC SURGERY

## 2019-05-14 PROCEDURE — 99213 OFFICE O/P EST LOW 20 MIN: CPT | Performed by: ORTHOPAEDIC SURGERY

## 2019-05-14 PROCEDURE — 3017F COLORECTAL CA SCREEN DOC REV: CPT | Performed by: ORTHOPAEDIC SURGERY

## 2019-05-14 PROCEDURE — G8427 DOCREV CUR MEDS BY ELIG CLIN: HCPCS | Performed by: ORTHOPAEDIC SURGERY

## 2019-05-14 PROCEDURE — 1036F TOBACCO NON-USER: CPT | Performed by: ORTHOPAEDIC SURGERY

## 2019-05-14 RX ORDER — OXYCODONE HYDROCHLORIDE AND ACETAMINOPHEN 5; 325 MG/1; MG/1
1-2 TABLET ORAL EVERY 6 HOURS PRN
Qty: 56 TABLET | Refills: 0 | Status: SHIPPED | OUTPATIENT
Start: 2019-05-14 | End: 2019-05-21

## 2019-05-14 NOTE — PROGRESS NOTES
Mr. Ru Murillo is a 31-year-old male who fell 15 feet 8 days ago while cleaning gutters. He was treated released from the emergency room at that time. He describes his pain as a dull ache that is localized to his right sided neck. He denies upper extremity pain, numbness, tingling, or weakness. He denies bowel or bladder dysfunction. Denies difficulty with fine motor skills. He states he felt unstable for a few days after the fall in which he used a cane for assistance, but has been improving. He has been taking muscle relaxant and percocet for pain. Patient has been wearing cervical collar since fall. General Exam:  Pt is alert and oriented x 3 and in no acute distress. Gait is heel toe with no limp or instability. Mood and affect are appropriate. HEENT  Normocephalic. Neck ROM is mildly decreased with mild pain  No rash or skin irritation. Upper Extremities:  Bilateral upper extremity with 5/5 motor function  Sensation is intact to light touch form C6-8  Reflexes are 1+  Full painfree ROM wrists, elbows and shoulders. No cyanosis, clubbing, edema or rash and the vasculature is intact. Negative Matias sign bilateral.    Lower Extremities:  Normal DTR knees   No clonus. I reviewed  AP and lateral x-rays of the cervical spine or obtain the office today. They show good alignment of his skull and cervical spine. I reviewed AP and lateral x-rays of his thoracic spine that were obtained in the office today. They show no significant collapse of his T1 compression fracture. I reviewed CT of his cervical spine from 5/6/19. They show minimally displaced left occipital condyle fracture. Impression:   Diagnosis Orders   1.  Closed fracture of left occipital condyle with routine healing       Assessment:  Minimally displaced left occipital condyle fracture    Plan:  I recommended he continue wearing his cervical collar and return for repeat AP and lateral cervical and thoracic spine x-rays in 4 omayra.    Todd Gates M.D.    Cc: Emerald Maldonado, APRN - CNP

## 2019-05-16 ENCOUNTER — TELEPHONE (OUTPATIENT)
Dept: ORTHOPEDIC SURGERY | Age: 59
End: 2019-05-16

## 2019-05-16 NOTE — TELEPHONE ENCOUNTER
[place]16[place]  [facility] [place]  [place] REQUIRED -  ONE PER YEAR - FOLLOW MEDICAID GUIDELINES - PER  DON -  REF # DON 5/16/17 @ 10:07 AM EST -   NDS

## 2019-05-20 ENCOUNTER — TELEPHONE (OUTPATIENT)
Dept: INTERNAL MEDICINE CLINIC | Age: 59
End: 2019-05-20

## 2019-05-20 NOTE — TELEPHONE ENCOUNTER
Attempted to contact patient on 5/20/2019. Result: left message on the patient's voicemail asking patient to return my call. Pre-Visit planning not completed.

## 2019-05-21 ENCOUNTER — OFFICE VISIT (OUTPATIENT)
Dept: INTERNAL MEDICINE CLINIC | Age: 59
End: 2019-05-21
Payer: MEDICARE

## 2019-05-21 VITALS
BODY MASS INDEX: 27.86 KG/M2 | DIASTOLIC BLOOD PRESSURE: 81 MMHG | RESPIRATION RATE: 14 BRPM | OXYGEN SATURATION: 98 % | HEIGHT: 71 IN | SYSTOLIC BLOOD PRESSURE: 119 MMHG | WEIGHT: 199 LBS | HEART RATE: 67 BPM | TEMPERATURE: 97.5 F

## 2019-05-21 DIAGNOSIS — S02.113D CLOSED FRACTURE OF LEFT OCCIPITAL CONDYLE WITH ROUTINE HEALING: Primary | ICD-10-CM

## 2019-05-21 DIAGNOSIS — M13.862 ALLERGIC ARTHRITIS OF LEFT KNEE: ICD-10-CM

## 2019-05-21 DIAGNOSIS — B18.2 CHRONIC HEPATITIS C WITHOUT HEPATIC COMA (HCC): ICD-10-CM

## 2019-05-21 PROCEDURE — 99214 OFFICE O/P EST MOD 30 MIN: CPT | Performed by: NURSE PRACTITIONER

## 2019-05-21 PROCEDURE — G8427 DOCREV CUR MEDS BY ELIG CLIN: HCPCS | Performed by: NURSE PRACTITIONER

## 2019-05-21 PROCEDURE — 3017F COLORECTAL CA SCREEN DOC REV: CPT | Performed by: NURSE PRACTITIONER

## 2019-05-21 PROCEDURE — 1036F TOBACCO NON-USER: CPT | Performed by: NURSE PRACTITIONER

## 2019-05-21 PROCEDURE — G8419 CALC BMI OUT NRM PARAM NOF/U: HCPCS | Performed by: NURSE PRACTITIONER

## 2019-05-21 ASSESSMENT — ENCOUNTER SYMPTOMS
COLOR CHANGE: 0
PHOTOPHOBIA: 0
NAUSEA: 0
VOMITING: 0
EYE PAIN: 0
BACK PAIN: 0
ABDOMINAL PAIN: 0
CHEST TIGHTNESS: 0
CONSTIPATION: 0
DIARRHEA: 0
CHOKING: 0

## 2019-05-21 NOTE — PROGRESS NOTES
Pt is here for:  OA left knee, Hep C positive,    Chief Complaint   Patient presents with    2 Week Follow-Up    Discuss Medications     wants something different for allergies - claritin not working     This is a 61 yr old CM, with a known past medical hx that includes asthma and OA of left knee, that presents today for follow up in regards to recent trauma resulting in closed facture of left occipital condyle following a fall at home. Patient states that he was planning to have left knee surgery at that end of the month, however; that has currently been postponed pending clearance of neck brace/recent trauma. HPI   Closed fracture of left occipital condyle with routine healing  This problem is acute and \"healing\". Patient f/w Dr. Liz Gates as OP. Currently has neck brace intact. Reports \"improving\". Will monitor   Arthritis of left knee  This problem is chronic and worsening 2/2 to recent fall. Large Suprapatellar joint effusion noted on xray. Patient of Dr. Mariana Grossman as OP, patient instructed to f/u today for status of pending surgery. Reports on pain medications given in ER. Will monitor. CT lumbar Spine w/o Contrast 05/06/2019  1. No acute lumbar spine abnormality   2. Multilevel degenerative changes with disc bulging at L4-L5 and L5-S1   Xray Ribs, right 05/06/2019  No acute cardiopulmonary disease.      Xray Left Knee 05/06/2019  No acute fracture or dislocation in the left knee.       Mild to moderate tricompartmental degenerative changes, most pronounced in   the lateral compartment.       Large suprapatellar joint effusion. No evidence of acute right rib fracture.    CT Thoracic Spine 05/06/2019  There is minimal loss of height, T1 vertebral body anterior superior endplate   and T2 vertebral body inferior endplate, both of indeterminate age. Lewayne Lek there   is localized pain or tenderness to this region, MRI would be recommended,   particularly given the stated mechanism of injury.     (SYMBICORT) 160-4.5 MCG/ACT AERO Inhale 2 puffs into the lungs 2 times daily 1 Inhaler 5    albuterol sulfate HFA (VENTOLIN HFA) 108 (90 Base) MCG/ACT inhaler INHALE TWO PUFFS BY MOUTH EVERY 6 HOURS AS NEEDED FOR WHEEZING 1 Inhaler 3       Family History   Problem Relation Age of Onset    Asthma Mother     Cancer Father        Social History     Socioeconomic History    Marital status:      Spouse name: Not on file    Number of children: Not on file    Years of education: Not on file    Highest education level: Not on file   Occupational History    Not on file   Social Needs    Financial resource strain: Not on file    Food insecurity:     Worry: Not on file     Inability: Not on file    Transportation needs:     Medical: Not on file     Non-medical: Not on file   Tobacco Use    Smoking status: Never Smoker    Smokeless tobacco: Never Used   Substance and Sexual Activity    Alcohol use: Yes     Alcohol/week: 1.2 oz     Types: 2 Shots of liquor per week    Drug use: Yes     Types: Marijuana     Comment: hemp oil now use to smoke marijuana    Sexual activity: Yes     Partners: Female   Lifestyle    Physical activity:     Days per week: Not on file     Minutes per session: Not on file    Stress: Not on file   Relationships    Social connections:     Talks on phone: Not on file     Gets together: Not on file     Attends Anabaptist service: Not on file     Active member of club or organization: Not on file     Attends meetings of clubs or organizations: Not on file     Relationship status: Not on file    Intimate partner violence:     Fear of current or ex partner: Not on file     Emotionally abused: Not on file     Physically abused: Not on file     Forced sexual activity: Not on file   Other Topics Concern    Not on file   Social History Narrative    Not on file       Review of Systems   Constitutional: Negative for activity change, fatigue and unexpected weight change.    HENT: Negative for congestion. Eyes: Negative for photophobia and pain. Respiratory: Negative for choking and chest tightness. Cardiovascular: Negative for palpitations and leg swelling. Gastrointestinal: Negative for abdominal pain, constipation, diarrhea, nausea and vomiting. Genitourinary: Negative for difficulty urinating and hematuria. Musculoskeletal: Positive for neck pain and neck stiffness (2/2 to trauma, see CT's as outlined above ). Negative for back pain. Skin: Negative for color change, pallor and rash. Neurological: Negative for dizziness, tremors, syncope, weakness and numbness. Psychiatric/Behavioral: Negative for agitation, confusion, hallucinations, self-injury, sleep disturbance and suicidal ideas. The patient is not nervous/anxious and is not hyperactive. Physical Exam   Nursing note reviewed  /81 (Site: Right Upper Arm, Position: Sitting, Cuff Size: Large Adult)   Pulse 67   Temp 97.5 °F (36.4 °C) (Oral)   Resp 14   Ht 5' 11\" (1.803 m)   Wt 199 lb (90.3 kg)   SpO2 98%   BMI 27.75 kg/m²   BP Readings from Last 3 Encounters:   05/21/19 119/81   05/14/19 122/75   05/08/19 (!) 125/90     Wt Readings from Last 3 Encounters:   05/21/19 199 lb (90.3 kg)   05/14/19 205 lb (93 kg)   05/07/19 205 lb (93 kg)      Body mass index is 27.75 kg/m². No results found for this visit on 05/21/19.    Lab Review   Orders Only on 05/08/2019   Component Date Value    Hep A IgM 05/07/2019 Non-reactive     Hep B Core Ab, IgM 05/07/2019 Non-reactive     Hep B S Ag Interp 05/07/2019 Non-reactive     Hep C Ab Interp 05/07/2019 REACTIVE*   Office Visit on 05/07/2019   Component Date Value    Sodium 05/07/2019 140     Potassium 05/07/2019 4.8     Chloride 05/07/2019 102     CO2 05/07/2019 24     Anion Gap 05/07/2019 14     Glucose 05/07/2019 104*    BUN 05/07/2019 15     CREATININE 05/07/2019 1.1     GFR Non- 05/07/2019 >60     GFR  05/07/2019 >60     Calcium 05/07/2019 9.6     Total Protein 05/07/2019 7.9     Alb 05/07/2019 4.1     Albumin/Globulin Ratio 05/07/2019 1.1     Total Bilirubin 05/07/2019 1.0     Alkaline Phosphatase 05/07/2019 85     ALT 05/07/2019 107*    AST 05/07/2019 82*    Globulin 05/07/2019 3.8     PSA 05/07/2019 0.42     Cholesterol, Total 05/07/2019 187     Triglycerides 05/07/2019 105     HDL 05/07/2019 49     LDL Calculated 05/07/2019 117*    VLDL Cholesterol Calcula* 05/07/2019 21    Admission on 05/06/2019, Discharged on 05/06/2019   Component Date Value    WBC 05/06/2019 11.9*    RBC 05/06/2019 5.21     Hemoglobin 05/06/2019 17.7*    Hematocrit 05/06/2019 51.6     MCV 05/06/2019 99.2     MCH 05/06/2019 34.0     MCHC 05/06/2019 34.3     RDW 05/06/2019 14.1     Platelets 46/55/7546 198     MPV 05/06/2019 9.0     Neutrophils % 05/06/2019 60.8     Lymphocytes % 05/06/2019 24.8     Monocytes % 05/06/2019 10.3     Eosinophils % 05/06/2019 3.3     Basophils % 05/06/2019 0.8     Neutrophils # 05/06/2019 7.3     Lymphocytes # 05/06/2019 3.0     Monocytes # 05/06/2019 1.2     Eosinophils # 05/06/2019 0.4     Basophils # 05/06/2019 0.1     Sodium 05/06/2019 132*    Potassium reflex Magnesi* 05/06/2019 4.3     Chloride 05/06/2019 103     CO2 05/06/2019 19*    Anion Gap 05/06/2019 10     Glucose 05/06/2019 105*    BUN 05/06/2019 10     CREATININE 05/06/2019 0.7*    GFR Non- 05/06/2019 >60     GFR  05/06/2019 >60     Calcium 05/06/2019 9.0     Total Protein 05/06/2019 8.0     Alb 05/06/2019 3.4     Albumin/Globulin Ratio 05/06/2019 0.7*    Total Bilirubin 05/06/2019 0.8     Alkaline Phosphatase 05/06/2019 78     ALT 05/06/2019 97*    AST 05/06/2019 81*    Globulin 05/06/2019 4.6     Protime 05/06/2019 12.2     INR 05/06/2019 1.07     aPTT 05/06/2019 35.4     Color, UA 05/06/2019 YELLOW     Clarity, UA 05/06/2019 Clear     Glucose, Ur 05/06/2019 Negative     Bilirubin Urine 05/06/2019 Negative     Ketones, Urine 05/06/2019 Negative     Specific Gravity, UA 05/06/2019 1.020     Blood, Urine 05/06/2019 Negative     pH, UA 05/06/2019 5.5     Protein, UA 05/06/2019 Negative     Urobilinogen, Urine 05/06/2019 0.2     Nitrite, Urine 05/06/2019 Negative     Leukocyte Esterase, Urine 05/06/2019 Negative     Microscopic Examination 05/06/2019 Not Indicated     Urine Type 05/06/2019 Not Specified    Orders Only on 05/03/2019   Component Date Value    Protime 05/03/2019 11.8     INR 05/03/2019 1.04     Transferrin 05/03/2019 281.0     Vit D, 25-Hydroxy 05/03/2019 27.1*    Hemoglobin A1C 05/03/2019 5.8     eAG 05/03/2019 119.8     WBC 05/03/2019 8.8     RBC 05/03/2019 5.34     Hemoglobin 05/03/2019 18.1*    Hematocrit 05/03/2019 52.8*    MCV 05/03/2019 98.9     MCH 05/03/2019 33.9     MCHC 05/03/2019 34.3     RDW 05/03/2019 14.4     Platelets 25/58/9796 227     MPV 05/03/2019 10.0     Neutrophils % 05/03/2019 43.9     Lymphocytes % 05/03/2019 36.2     Monocytes % 05/03/2019 11.5     Eosinophils % 05/03/2019 7.6     Basophils % 05/03/2019 0.8     Neutrophils # 05/03/2019 3.9     Lymphocytes # 05/03/2019 3.2     Monocytes # 05/03/2019 1.0     Eosinophils # 05/03/2019 0.7*    Basophils # 05/03/2019 0.1     Sodium 05/03/2019 139     Potassium 05/03/2019 5.4*    Chloride 05/03/2019 101     CO2 05/03/2019 26     Anion Gap 05/03/2019 12     Glucose 05/03/2019 84     BUN 05/03/2019 14     CREATININE 05/03/2019 0.8*    GFR Non- 05/03/2019 >60     GFR  05/03/2019 >60     Calcium 05/03/2019 9.4     aPTT 05/03/2019 35.5     Alb 05/03/2019 4.2      /81 (Site: Right Upper Arm, Position: Sitting, Cuff Size: Large Adult)   Pulse 67   Temp 97.5 °F (36.4 °C) (Oral)   Resp 14   Ht 5' 11\" (1.803 m)   Wt 199 lb (90.3 kg)   SpO2 98%   BMI 27.75 kg/m²       Physical Exam   Constitutional: He is oriented to person, place, and time. He appears well-developed and well-nourished. HENT:   Head: Normocephalic. Nose: Right sinus exhibits no maxillary sinus tenderness and no frontal sinus tenderness. Left sinus exhibits no maxillary sinus tenderness and no frontal sinus tenderness. Mouth/Throat: Mucous membranes are normal. No oropharyngeal exudate, posterior oropharyngeal edema, posterior oropharyngeal erythema or tonsillar abscesses. Eyes: Pupils are equal, round, and reactive to light. Cardiovascular: Normal rate and regular rhythm. No murmur heard. Pulmonary/Chest: Effort normal. No respiratory distress. He has wheezes. He has no rales. He exhibits no tenderness. Abdominal: Soft. Bowel sounds are normal. He exhibits no distension and no mass. There is no tenderness. There is no rebound and no guarding. Musculoskeletal: He exhibits edema (to left knee, 2/2 to trauma ) and tenderness (to neck 2/2 to fall ). Cervical back: He exhibits tenderness, swelling, pain and spasm. Lymphadenopathy:     He has cervical adenopathy (noted on CT as outlined above ). Neurological: He is alert and oriented to person, place, and time. He has normal reflexes. No cranial nerve deficit. Skin: Skin is warm and dry. No rash noted. No erythema. Psychiatric: He has a normal mood and affect. His behavior is normal.        Assessment/Plan   Carlota Oh was seen today for established new doctor, other and medication refill.   Diagnoses and all orders for this visit:    Closed displaced fracture of first cervical vertebra, unspecified fracture morphology, [facility]  -Recs per Dr. Tereso Mckeon recommended to  continue wearing his cervical collar and return for repeat AP and lateral cervical and thoracic spine x-rays in 4 weeks    Osteoarthritis of left knee, unspecified osteoarthritis type, stable  -Currently has f/u with Dr. Danial Rider for pending surgery, instructed to f/u today given recent C1 injury, suspect difficult intubation and likely will need postponed pending recs, currently s/p steriod injection, stable     All questions addressed and answered. Patient agrees with plan of care. Patient will follow up for pre-op as needed   No follow-ups on file.

## 2019-05-24 ENCOUNTER — TELEPHONE (OUTPATIENT)
Dept: INTERNAL MEDICINE CLINIC | Age: 59
End: 2019-05-24

## 2019-05-24 DIAGNOSIS — M13.862 ALLERGIC ARTHRITIS OF LEFT KNEE: Primary | ICD-10-CM

## 2019-05-24 DIAGNOSIS — J30.2 SEASONAL ALLERGIES: ICD-10-CM

## 2019-05-24 RX ORDER — FEXOFENADINE HCL 180 MG/1
180 TABLET ORAL DAILY
Qty: 30 TABLET | Refills: 0 | Status: SHIPPED | OUTPATIENT
Start: 2019-05-24 | End: 2019-06-23

## 2019-06-11 ENCOUNTER — PRE-EVALUATION (OUTPATIENT)
Dept: ORTHOPEDIC SURGERY | Age: 59
End: 2019-06-11

## 2019-06-11 ENCOUNTER — OFFICE VISIT (OUTPATIENT)
Dept: ORTHOPEDIC SURGERY | Age: 59
End: 2019-06-11
Payer: MEDICARE

## 2019-06-11 VITALS — BODY MASS INDEX: 27.72 KG/M2 | WEIGHT: 198 LBS | HEIGHT: 71 IN

## 2019-06-11 DIAGNOSIS — S02.113D CLOSED FRACTURE OF LEFT OCCIPITAL CONDYLE WITH ROUTINE HEALING: Primary | ICD-10-CM

## 2019-06-11 DIAGNOSIS — S12.091D OTHER CLOSED NONDISPLACED FRACTURE OF FIRST CERVICAL VERTEBRA WITH ROUTINE HEALING, SUBSEQUENT ENCOUNTER: ICD-10-CM

## 2019-06-11 PROCEDURE — 99213 OFFICE O/P EST LOW 20 MIN: CPT | Performed by: PHYSICIAN ASSISTANT

## 2019-06-11 PROCEDURE — G8419 CALC BMI OUT NRM PARAM NOF/U: HCPCS | Performed by: PHYSICIAN ASSISTANT

## 2019-06-11 PROCEDURE — 3017F COLORECTAL CA SCREEN DOC REV: CPT | Performed by: PHYSICIAN ASSISTANT

## 2019-06-11 PROCEDURE — APPNB30 APP NON BILLABLE TIME 0-30 MINS: Performed by: PHYSICIAN ASSISTANT

## 2019-06-11 PROCEDURE — 1036F TOBACCO NON-USER: CPT | Performed by: PHYSICIAN ASSISTANT

## 2019-06-11 PROCEDURE — G8427 DOCREV CUR MEDS BY ELIG CLIN: HCPCS | Performed by: PHYSICIAN ASSISTANT

## 2019-06-11 RX ORDER — NAPROXEN SODIUM 220 MG
220 TABLET ORAL 2 TIMES DAILY WITH MEALS
COMMUNITY
End: 2019-06-25

## 2019-06-11 NOTE — PROGRESS NOTES
Mr. Sheela Yañez is a 63-year-old male who fell 15 feet 5 weeks ago while cleaning gutters. He was treated and released from the emergency room at that time. He notes improvement in his right sided neck pain since last visit. He denies upper extremity pain, numbness, tingling, or weakness. He denies bowel or bladder dysfunction. Denies difficulty with fine motor skills. He states he felt unstable for a few days after the fall in which he used a cane for assistance, but has not had any gait instability recently. He has been taking muscle relaxant and percocet for pain. He stopped wearing his cervical collar one week ago. General Exam:  Pt is alert and oriented x 3 and in no acute distress. Gait is heel toe with no limp or instability. Mood and affect are appropriate. HEENT  Normocephalic. Neck ROM is mildly decreased with mild pain  No rash or skin irritation. Upper Extremities:  Bilateral upper extremity with 5/5 motor function  Sensation is intact to light touch form C6-8  Reflexes are 1+  Full painfree ROM wrists, elbows and shoulders. No cyanosis, clubbing, edema or rash and the vasculature is intact. Negative Matias sign bilateral.    Lower Extremities:  Normal DTR knees   No clonus. I independently reviewed AP and lateral x-rays of the cervical spine that were obtained in the office today. They show good alignment of his skull and cervical spine. I reviewed AP and lateral x-rays of his thoracic spine that were obtained in the office today. They show no significant collapse of his T1 or T2 compression fracture. I reviewed CT of his cervical spine from 5/6/19. They show minimally displaced left occipital condyle fracture. Impression:   Diagnosis Orders   1.  Closed fracture of left occipital condyle with routine healing  XR CERVICAL SPINE (2-3 VIEWS)    XR THORACIC SPINE (2 VIEWS)     Assessment:  Minimally displaced left occipital condyle fracture    Plan:  I recommended he continue wearing his cervical collar and return for repeat AP and lateral cervical and thoracic spine x-rays in 4 weeks.

## 2019-06-25 ENCOUNTER — PREP FOR PROCEDURE (OUTPATIENT)
Dept: ORTHOPEDIC SURGERY | Age: 59
End: 2019-06-25

## 2019-06-25 ENCOUNTER — HOSPITAL ENCOUNTER (OUTPATIENT)
Dept: PREADMISSION TESTING | Age: 59
Discharge: HOME | End: 2019-06-29
Payer: MEDICARE

## 2019-06-25 ENCOUNTER — OFFICE VISIT (OUTPATIENT)
Dept: INTERNAL MEDICINE CLINIC | Age: 59
End: 2019-06-25
Payer: MEDICARE

## 2019-06-25 VITALS
DIASTOLIC BLOOD PRESSURE: 76 MMHG | HEART RATE: 80 BPM | BODY MASS INDEX: 25.77 KG/M2 | TEMPERATURE: 98.3 F | WEIGHT: 180 LBS | HEIGHT: 70 IN | SYSTOLIC BLOOD PRESSURE: 124 MMHG | OXYGEN SATURATION: 97 %

## 2019-06-25 DIAGNOSIS — M17.12 ARTHRITIS OF LEFT KNEE: ICD-10-CM

## 2019-06-25 DIAGNOSIS — J45.20 MILD INTERMITTENT ASTHMA WITHOUT COMPLICATION: ICD-10-CM

## 2019-06-25 DIAGNOSIS — E78.2 MIXED HYPERLIPIDEMIA: ICD-10-CM

## 2019-06-25 DIAGNOSIS — M17.12 ARTHRITIS OF LEFT KNEE: Primary | ICD-10-CM

## 2019-06-25 DIAGNOSIS — Z01.818 PRE-OP EXAMINATION: Primary | ICD-10-CM

## 2019-06-25 LAB
ABO/RH: NORMAL
ALBUMIN SERPL-MCNC: 4 G/DL (ref 3.4–5)
ANION GAP SERPL CALCULATED.3IONS-SCNC: 12 MMOL/L (ref 3–16)
ANTIBODY SCREEN: NORMAL
APTT: 43.7 SEC (ref 26–36)
BASOPHILS ABSOLUTE: 0.1 K/UL (ref 0–0.2)
BASOPHILS RELATIVE PERCENT: 1.3 %
BILIRUBIN URINE: NEGATIVE
BLOOD, URINE: NEGATIVE
BUN BLDV-MCNC: 12 MG/DL (ref 7–20)
CALCIUM SERPL-MCNC: 9.4 MG/DL (ref 8.3–10.6)
CHLORIDE BLD-SCNC: 101 MMOL/L (ref 99–110)
CLARITY: CLEAR
CO2: 25 MMOL/L (ref 21–32)
COLOR: YELLOW
CREAT SERPL-MCNC: 0.8 MG/DL (ref 0.9–1.3)
EKG ATRIAL RATE: 66 BPM
EKG DIAGNOSIS: NORMAL
EKG P AXIS: 63 DEGREES
EKG P-R INTERVAL: 180 MS
EKG Q-T INTERVAL: 406 MS
EKG QRS DURATION: 98 MS
EKG QTC CALCULATION (BAZETT): 425 MS
EKG R AXIS: -68 DEGREES
EKG T AXIS: 53 DEGREES
EKG VENTRICULAR RATE: 66 BPM
EOSINOPHILS ABSOLUTE: 0.6 K/UL (ref 0–0.6)
EOSINOPHILS RELATIVE PERCENT: 8.7 %
ESTIMATED AVERAGE GLUCOSE: 116.9 MG/DL
GFR AFRICAN AMERICAN: >60
GFR NON-AFRICAN AMERICAN: >60
GLUCOSE BLD-MCNC: 94 MG/DL (ref 70–99)
GLUCOSE URINE: NEGATIVE MG/DL
HBA1C MFR BLD: 5.7 %
HCT VFR BLD CALC: 49.8 % (ref 40.5–52.5)
HEMOGLOBIN: 17.1 G/DL (ref 13.5–17.5)
INR BLD: 0.99 (ref 0.86–1.14)
KETONES, URINE: NEGATIVE MG/DL
LEUKOCYTE ESTERASE, URINE: NEGATIVE
LYMPHOCYTES ABSOLUTE: 2.7 K/UL (ref 1–5.1)
LYMPHOCYTES RELATIVE PERCENT: 42.1 %
MCH RBC QN AUTO: 33.8 PG (ref 26–34)
MCHC RBC AUTO-ENTMCNC: 34.2 G/DL (ref 31–36)
MCV RBC AUTO: 98.6 FL (ref 80–100)
MICROSCOPIC EXAMINATION: NORMAL
MONOCYTES ABSOLUTE: 0.7 K/UL (ref 0–1.3)
MONOCYTES RELATIVE PERCENT: 11.1 %
NEUTROPHILS ABSOLUTE: 2.3 K/UL (ref 1.7–7.7)
NEUTROPHILS RELATIVE PERCENT: 36.8 %
NITRITE, URINE: NEGATIVE
PDW BLD-RTO: 14.9 % (ref 12.4–15.4)
PH UA: 5 (ref 5–8)
PLATELET # BLD: 185 K/UL (ref 135–450)
PMV BLD AUTO: 9.4 FL (ref 5–10.5)
POTASSIUM SERPL-SCNC: 4.6 MMOL/L (ref 3.5–5.1)
PREALBUMIN: 18.7 MG/DL (ref 20–40)
PROTEIN UA: NEGATIVE MG/DL
PROTHROMBIN TIME: 11.3 SEC (ref 9.8–13)
RBC # BLD: 5.05 M/UL (ref 4.2–5.9)
SEDIMENTATION RATE, ERYTHROCYTE: 11 MM/HR (ref 0–20)
SODIUM BLD-SCNC: 138 MMOL/L (ref 136–145)
SPECIFIC GRAVITY UA: 1.01 (ref 1–1.03)
URINE REFLEX TO CULTURE: NORMAL
URINE TYPE: NORMAL
UROBILINOGEN, URINE: 1 E.U./DL
VITAMIN D 25-HYDROXY: 28 NG/ML
WBC # BLD: 6.4 K/UL (ref 4–11)

## 2019-06-25 PROCEDURE — 86850 RBC ANTIBODY SCREEN: CPT

## 2019-06-25 PROCEDURE — 87641 MR-STAPH DNA AMP PROBE: CPT

## 2019-06-25 PROCEDURE — 83036 HEMOGLOBIN GLYCOSYLATED A1C: CPT

## 2019-06-25 PROCEDURE — G8419 CALC BMI OUT NRM PARAM NOF/U: HCPCS | Performed by: NURSE PRACTITIONER

## 2019-06-25 PROCEDURE — 85730 THROMBOPLASTIN TIME PARTIAL: CPT

## 2019-06-25 PROCEDURE — 93005 ELECTROCARDIOGRAM TRACING: CPT

## 2019-06-25 PROCEDURE — 81003 URINALYSIS AUTO W/O SCOPE: CPT

## 2019-06-25 PROCEDURE — 84134 ASSAY OF PREALBUMIN: CPT

## 2019-06-25 PROCEDURE — G8427 DOCREV CUR MEDS BY ELIG CLIN: HCPCS | Performed by: NURSE PRACTITIONER

## 2019-06-25 PROCEDURE — 86900 BLOOD TYPING SEROLOGIC ABO: CPT

## 2019-06-25 PROCEDURE — 99215 OFFICE O/P EST HI 40 MIN: CPT | Performed by: NURSE PRACTITIONER

## 2019-06-25 PROCEDURE — 93010 ELECTROCARDIOGRAM REPORT: CPT | Performed by: INTERNAL MEDICINE

## 2019-06-25 PROCEDURE — 86901 BLOOD TYPING SEROLOGIC RH(D): CPT

## 2019-06-25 PROCEDURE — 85610 PROTHROMBIN TIME: CPT

## 2019-06-25 PROCEDURE — 85025 COMPLETE CBC W/AUTO DIFF WBC: CPT

## 2019-06-25 PROCEDURE — 3017F COLORECTAL CA SCREEN DOC REV: CPT | Performed by: NURSE PRACTITIONER

## 2019-06-25 PROCEDURE — 85652 RBC SED RATE AUTOMATED: CPT

## 2019-06-25 PROCEDURE — 80048 BASIC METABOLIC PNL TOTAL CA: CPT

## 2019-06-25 PROCEDURE — 82040 ASSAY OF SERUM ALBUMIN: CPT

## 2019-06-25 PROCEDURE — 1036F TOBACCO NON-USER: CPT | Performed by: NURSE PRACTITIONER

## 2019-06-25 PROCEDURE — 82306 VITAMIN D 25 HYDROXY: CPT

## 2019-06-25 ASSESSMENT — ENCOUNTER SYMPTOMS
SORE THROAT: 0
WHEEZING: 0
NAUSEA: 0
CONSTIPATION: 0
DIARRHEA: 0
SINUS PAIN: 0
RHINORRHEA: 0
COLOR CHANGE: 0
PHOTOPHOBIA: 0
SINUS PRESSURE: 0
COUGH: 0
SHORTNESS OF BREATH: 0
VOMITING: 0
TROUBLE SWALLOWING: 0
ABDOMINAL PAIN: 0
EYE PAIN: 0
BACK PAIN: 0
CHEST TIGHTNESS: 0

## 2019-06-25 ASSESSMENT — PATIENT HEALTH QUESTIONNAIRE - PHQ9
1. LITTLE INTEREST OR PLEASURE IN DOING THINGS: 0
SUM OF ALL RESPONSES TO PHQ QUESTIONS 1-9: 0
SUM OF ALL RESPONSES TO PHQ QUESTIONS 1-9: 0
SUM OF ALL RESPONSES TO PHQ9 QUESTIONS 1 & 2: 0
2. FEELING DOWN, DEPRESSED OR HOPELESS: 0

## 2019-06-25 NOTE — PROGRESS NOTES
Pt is here for: pre-op examination for left total knee replacement per Dr. Quentin Lindsay to be performed 07/03/2019    Chief Complaint   Patient presents with    Pre-op Exam       HPI      This is a 61 yr old CM, with a known past medical hx that includes asthma and OA of left knee, that presents today at the request of Dr. Quentin Lindsay for pending left total knee replacement to per performed 07/03/2019. Patient does report recent exacerbation of pain that occurred ~6 weeks ago when patient experienced a ~15 foot fall off the roof, at which time reportedly underwent pain injection to left knee. He states he has had arthritis of the left knee for many years, and that surgery has been planned for awhile. Reports he has had anethesia in the past and tolerates well. Denies any recent illness, including cough, fevers, chills, SOB/CP, N/V/C/D. No other acute concerns at this time. Arthritis of left knee  This problem is chronic and worsening 2/2 to recent fall. Large Suprapatellar joint effusion noted on xray. Patient of Dr. Quentin Lindsay as OP, patient instructed to f/u today for status of pending surgery. Reports on pain medications given in ER. Will monitor. Asthma   He complains of wheezing. There is no chest tightness, cough, difficulty breathing, frequent throat clearing, hemoptysis, hoarse voice, shortness of breath or sputum production. This is a chronic problem. The current episode started more than 1 year ago. The problem occurs intermittently. The problem has been waxing and waning. Pertinent negatives include no appetite change, chest pain, dyspnea on exertion, ear congestion, ear pain, fever, headaches, heartburn, malaise/fatigue, myalgias, nasal congestion, orthopnea, PND, postnasal drip, rhinorrhea, sneezing, sore throat, sweats, trouble swallowing or weight loss. His symptoms are aggravated by strenuous activity and change in weather.  His symptoms are alleviated by beta-agonist, change in position, rest and ipratropium. He reports complete improvement on treatment. His past medical history is significant for asthma. Xray Left Knee 05/06/2019  No acute fracture or dislocation in the left knee.       Mild to moderate tricompartmental degenerative changes, most pronounced in   the lateral compartment.       Large suprapatellar joint effusion. /76 (Site: Right Upper Arm, Position: Sitting, Cuff Size: Large Adult)   Pulse 80   Temp 98.3 °F (36.8 °C)   Ht 5' 10\" (1.778 m)   Wt 180 lb (81.6 kg)   SpO2 97%   BMI 25.83 kg/m²       Past Medical History:   Diagnosis Date    Arthritis     Asthma     Asthmatic bronchitis    Cancer ([facility] 23 years ago    non-hodgkins lymphoma--5 weeks of radiation    Chronic pain of left knee 6/23/2016    Environmental and seasonal allergies 06/23/2016    cats/dogs/mold/milk    Hepatitis C     Moderate persistent asthma without complication 3/28/8726       Past Surgical History:   Procedure Laterality Date    APPENDECTOMY      KNEE ARTHROSCOPY Left 09/13/2016    L knee video arthroscopy with partial lateral menisectomy and plica resection    LYMPH NODE BIOPSY      lymph node in groin area    SHOULDER SURGERY Left 1978    reconstruction of left shoulder        Allergies   Allergen Reactions    Other Shortness Of Breath     Dogs/cats/mold/dust mite/cows milk--watery eyes/ sneezing       No outpatient medications have been marked as taking for the 6/25/19 encounter (Office Visit) with Fronie Lefort, APRN - CNP.        Family History   Problem Relation Age of Onset    Asthma Mother     Cancer Father        Social History     Socioeconomic History    Marital status:      Spouse name: Not on file    Number of children: Not on file    Years of education: Not on file    Highest education level: Not on file   Occupational History    Not on file   Social Needs    Financial resource strain: Not on file    Food insecurity:     Worry: Not on file Inability: Not on file    Transportation needs:     Medical: Not on file     Non-medical: Not on file   Tobacco Use    Smoking status: Never Smoker    Smokeless tobacco: Never Used   Substance and Sexual Activity    Alcohol use: Yes     Alcohol/week: 1.2 oz     Types: 2 Shots of liquor per week    Drug use: Yes     Types: Marijuana     Comment: hemp oil now use to smoke marijuana    Sexual activity: Yes     Partners: Female   Lifestyle    Physical activity:     Days per week: Not on file     Minutes per session: Not on file    Stress: Not on file   Relationships    Social connections:     Talks on phone: Not on file     Gets together: Not on file     Attends Jew service: Not on file     Active member of club or organization: Not on file     Attends meetings of clubs or organizations: Not on file     Relationship status: Not on file    Intimate partner violence:     Fear of current or ex partner: Not on file     Emotionally abused: Not on file     Physically abused: Not on file     Forced sexual activity: Not on file   Other Topics Concern    Not on file   Social History Narrative    Not on file       Review of Systems   Constitutional: Negative for activity change, appetite change and fever. HENT: Negative for congestion, mouth sores, nosebleeds, postnasal drip, rhinorrhea, sinus pressure, sinus pain, sneezing, sore throat and trouble swallowing. Eyes: Negative for photophobia and pain. Respiratory: Negative for cough, chest tightness, shortness of breath and wheezing. Cardiovascular: Negative for chest pain, palpitations and leg swelling. Gastrointestinal: Negative for abdominal pain, constipation, diarrhea, nausea and vomiting. Endocrine: Negative for polydipsia, polyphagia and polyuria. Genitourinary: Negative for difficulty urinating and hematuria.    Musculoskeletal: Positive for gait problem (2/2 to arthritis to left knee, chronic ), joint swelling and neck stiffness (2/2 to recent trauma ). Negative for back pain and neck pain. Skin: Negative for color change, pallor and rash. Neurological: Negative for dizziness, tremors, syncope, weakness, numbness and headaches. Hematological: Negative for adenopathy. Psychiatric/Behavioral: Negative for dysphoric mood, hallucinations, self-injury, sleep disturbance and suicidal ideas. The patient is not nervous/anxious and is not hyperactive. Physical Exam   Nursing note reviewed  /76 (Site: Right Upper Arm, Position: Sitting, Cuff Size: Large Adult)   Pulse 80   Ht 5' 10\" (1.778 m)   Wt 180 lb (81.6 kg)   SpO2 97%   BMI 25.83 kg/m²   BP Readings from Last 3 Encounters:   06/25/19 124/76   05/21/19 119/81   05/14/19 122/75     Wt Readings from Last 3 Encounters:   06/25/19 180 lb (81.6 kg)   06/11/19 198 lb (89.8 kg)   05/21/19 199 lb (90.3 kg)     Body mass index is 25.83 kg/m². No results found for this visit on 06/25/19. Physical Exam   Constitutional: He is oriented to person, place, and time. He appears well-developed and well-nourished. HENT:   Head: Normocephalic. Right Ear: No tenderness. Tympanic membrane is not erythematous. Left Ear: No tenderness. Tympanic membrane is not erythematous. Nose: Right sinus exhibits no maxillary sinus tenderness and no frontal sinus tenderness. Left sinus exhibits no maxillary sinus tenderness and no frontal sinus tenderness. Mouth/Throat: No oropharyngeal exudate, posterior oropharyngeal edema, posterior oropharyngeal erythema or tonsillar abscesses. Eyes: Pupils are equal, round, and reactive to light. Right eye exhibits no discharge. Neck: Normal range of motion. No JVD present. No tracheal deviation present. No thyromegaly present. Cardiovascular: Normal rate and regular rhythm. No murmur heard. Pulmonary/Chest: Breath sounds normal. No respiratory distress. He has no wheezes. He has no rales. He exhibits no tenderness. Abdominal: Soft.  Bowel

## 2019-06-26 ENCOUNTER — TELEPHONE (OUTPATIENT)
Dept: ORTHOPEDIC SURGERY | Age: 59
End: 2019-06-26

## 2019-06-26 ENCOUNTER — PREP FOR PROCEDURE (OUTPATIENT)
Dept: ORTHOPEDICS UNIT | Age: 59
End: 2019-06-26

## 2019-06-26 LAB — MRSA SCREEN RT-PCR: NORMAL

## 2019-06-26 RX ORDER — OXYCODONE HYDROCHLORIDE 5 MG/1
10 TABLET ORAL ONCE
Status: CANCELLED | OUTPATIENT
Start: 2019-06-26 | End: 2019-06-26

## 2019-06-26 RX ORDER — CELECOXIB 200 MG/1
200 CAPSULE ORAL ONCE
Status: CANCELLED | OUTPATIENT
Start: 2019-06-26 | End: 2019-06-26

## 2019-06-27 ENCOUNTER — OFFICE VISIT (OUTPATIENT)
Dept: ORTHOPEDIC SURGERY | Age: 59
End: 2019-06-27

## 2019-06-27 DIAGNOSIS — M17.12 ARTHRITIS OF LEFT KNEE: Primary | ICD-10-CM

## 2019-06-27 PROCEDURE — 99999 PR OFFICE/OUTPT VISIT,PROCEDURE ONLY: CPT | Performed by: ORTHOPAEDIC SURGERY

## 2019-07-01 DIAGNOSIS — M17.12 ARTHRITIS OF LEFT KNEE: Primary | ICD-10-CM

## 2019-07-01 PROCEDURE — MISC60 ORTHOTIC REFURBISHMENT 60: Performed by: ORTHOPAEDIC SURGERY

## 2019-07-01 RX ORDER — COVID-19 ANTIGEN TEST
2 KIT MISCELLANEOUS 2 TIMES DAILY
Status: ON HOLD | COMMUNITY
End: 2019-07-03 | Stop reason: HOSPADM

## 2019-07-02 ENCOUNTER — ANESTHESIA EVENT (OUTPATIENT)
Dept: OPERATING ROOM | Age: 59
DRG: 302 | End: 2019-07-02
Payer: MEDICARE

## 2019-07-02 NOTE — CARE COORDINATION
DATE OF JET CLASS INTERVIEW: 6/25/19  FIRST JOINT REPLACEMENT? Yes     CHOICES FOR HHC, DME VENDORS AND SKILLED/ REHAB FACILITIES PROVIDED TO PT DURING THIS INTERVIEW. DISCHARGE PLAN:/ INCLUDING WHO WILL BE STAYING WITH YOU AT HOME? Pt plans to go home with his mother,  He has 1 step to get in with a railing and is able to do one floor living. LENGTH OF STAY HAS BEEN DISCUSSED WITH THE PT, APPROPRIATE TO HIS/ HER PROCEDURE. PT HAS BEEN INFORMED THAT THEY WILL BE DISCHARGED WHEN THE PHYSICIAN DEEMS THEM MEDICALLY READY. MOST PATIENTS CAN EXPECT  TO BE IN THE HOSPITAL ONE NIGHT AS AN OBSERVATION ONLY, OR 1-2 DAYS AS AN ADMISSION FOR THOSE WITH MEDICAL HEALTH  ISSUES/COMPLICATIONS. HOME CARE:Pt is agreeable to [facility] and understands it may be delayed a day after the holiday to start care    SNF/REHAB:Not discussed    DME:Pt needs a walker    MEDICATION:Pt agrees to have Ervin Reddy complete prior auth, if needed, and fill scripts formedication and pain medications  prior to discharge. TRANSPORTATION: Pt's mother June will drive pt home [phone]    Contact information for case management provided to pt. Will follow with therapies and social service.

## 2019-07-03 ENCOUNTER — HOSPITAL ENCOUNTER (INPATIENT)
Age: 59
LOS: 1 days | Discharge: HOME | DRG: 302 | End: 2019-07-04
Attending: ORTHOPAEDIC SURGERY | Admitting: ORTHOPAEDIC SURGERY
Payer: MEDICARE

## 2019-07-03 ENCOUNTER — ANESTHESIA (OUTPATIENT)
Dept: OPERATING ROOM | Age: 59
DRG: 302 | End: 2019-07-03
Payer: MEDICARE

## 2019-07-03 ENCOUNTER — APPOINTMENT (OUTPATIENT)
Dept: GENERAL RADIOLOGY | Age: 59
DRG: 302 | End: 2019-07-03
Attending: ORTHOPAEDIC SURGERY
Payer: MEDICARE

## 2019-07-03 VITALS — TEMPERATURE: 97.7 F | OXYGEN SATURATION: 98 % | SYSTOLIC BLOOD PRESSURE: 89 MMHG | DIASTOLIC BLOOD PRESSURE: 50 MMHG

## 2019-07-03 DIAGNOSIS — M17.12 ARTHRITIS OF LEFT KNEE: Primary | ICD-10-CM

## 2019-07-03 LAB
ABO/RH: NORMAL
ANTIBODY SCREEN: NORMAL
APTT: 36.1 SEC (ref 26–36)
GLUCOSE BLD-MCNC: 116 MG/DL (ref 70–99)
GLUCOSE BLD-MCNC: 81 MG/DL (ref 70–99)
PERFORMED ON: ABNORMAL
PERFORMED ON: NORMAL

## 2019-07-03 PROCEDURE — 2580000003 HC RX 258: Performed by: ANESTHESIOLOGY

## 2019-07-03 PROCEDURE — 6370000000 HC RX 637 (ALT 250 FOR IP): Performed by: ORTHOPAEDIC SURGERY

## 2019-07-03 PROCEDURE — 94761 N-INVAS EAR/PLS OXIMETRY MLT: CPT

## 2019-07-03 PROCEDURE — 97530 THERAPEUTIC ACTIVITIES: CPT

## 2019-07-03 PROCEDURE — 88305 TISSUE EXAM BY PATHOLOGIST: CPT

## 2019-07-03 PROCEDURE — 6360000002 HC RX W HCPCS: Performed by: ORTHOPAEDIC SURGERY

## 2019-07-03 PROCEDURE — 97162 PT EVAL MOD COMPLEX 30 MIN: CPT

## 2019-07-03 PROCEDURE — 97166 OT EVAL MOD COMPLEX 45 MIN: CPT

## 2019-07-03 PROCEDURE — 2580000003 HC RX 258: Performed by: ORTHOPAEDIC SURGERY

## 2019-07-03 PROCEDURE — 94760 N-INVAS EAR/PLS OXIMETRY 1: CPT

## 2019-07-03 PROCEDURE — 97110 THERAPEUTIC EXERCISES: CPT

## 2019-07-03 PROCEDURE — 73560 X-RAY EXAM OF KNEE 1 OR 2: CPT

## 2019-07-03 PROCEDURE — 94640 AIRWAY INHALATION TREATMENT: CPT

## 2019-07-03 PROCEDURE — G0378 HOSPITAL OBSERVATION PER HR: HCPCS

## 2019-07-03 PROCEDURE — 97116 GAIT TRAINING THERAPY: CPT

## 2019-07-03 PROCEDURE — 3600000005 HC SURGERY LEVEL 5 BASE: Performed by: ORTHOPAEDIC SURGERY

## 2019-07-03 PROCEDURE — 97535 SELF CARE MNGMENT TRAINING: CPT

## 2019-07-03 PROCEDURE — 2500000003 HC RX 250 WO HCPCS: Performed by: ORTHOPAEDIC SURGERY

## 2019-07-03 PROCEDURE — 96372 THER/PROPH/DIAG INJ SC/IM: CPT

## 2019-07-03 PROCEDURE — 94150 VITAL CAPACITY TEST: CPT

## 2019-07-03 PROCEDURE — 3600000015 HC SURGERY LEVEL 5 ADDTL 15MIN: Performed by: ORTHOPAEDIC SURGERY

## 2019-07-03 PROCEDURE — 88311 DECALCIFY TISSUE: CPT

## 2019-07-03 PROCEDURE — 2500000003 HC RX 250 WO HCPCS

## 2019-07-03 PROCEDURE — 2720000010 HC SURG SUPPLY STERILE: Performed by: ORTHOPAEDIC SURGERY

## 2019-07-03 PROCEDURE — 3700000001 HC ADD 15 MINUTES (ANESTHESIA): Performed by: ORTHOPAEDIC SURGERY

## 2019-07-03 PROCEDURE — 85730 THROMBOPLASTIN TIME PARTIAL: CPT

## 2019-07-03 PROCEDURE — 1200000000 HC SEMI PRIVATE

## 2019-07-03 PROCEDURE — 3700000000 HC ANESTHESIA ATTENDED CARE: Performed by: ORTHOPAEDIC SURGERY

## 2019-07-03 PROCEDURE — 86850 RBC ANTIBODY SCREEN: CPT

## 2019-07-03 PROCEDURE — 7100000000 HC PACU RECOVERY - FIRST 15 MIN: Performed by: ORTHOPAEDIC SURGERY

## 2019-07-03 PROCEDURE — 2709999900 HC NON-CHARGEABLE SUPPLY: Performed by: ORTHOPAEDIC SURGERY

## 2019-07-03 PROCEDURE — C1713 ANCHOR/SCREW BN/BN,TIS/BN: HCPCS | Performed by: ORTHOPAEDIC SURGERY

## 2019-07-03 PROCEDURE — C1776 JOINT DEVICE (IMPLANTABLE): HCPCS | Performed by: ORTHOPAEDIC SURGERY

## 2019-07-03 PROCEDURE — 86901 BLOOD TYPING SEROLOGIC RH(D): CPT

## 2019-07-03 PROCEDURE — 86900 BLOOD TYPING SEROLOGIC ABO: CPT

## 2019-07-03 PROCEDURE — 7100000001 HC PACU RECOVERY - ADDTL 15 MIN: Performed by: ORTHOPAEDIC SURGERY

## 2019-07-03 PROCEDURE — 6360000002 HC RX W HCPCS

## 2019-07-03 PROCEDURE — 0SRD0JA REPLACEMENT OF LEFT KNEE JOINT WITH SYNTHETIC SUBSTITUTE, UNCEMENTED, OPEN APPROACH: ICD-10-PCS | Performed by: ORTHOPAEDIC SURGERY

## 2019-07-03 DEVICE — PSN TIB POR 2 PEG SZ F L: Type: IMPLANTABLE DEVICE | Site: KNEE | Status: FUNCTIONAL

## 2019-07-03 DEVICE — IMPLANTABLE DEVICE: Type: IMPLANTABLE DEVICE | Site: KNEE | Status: FUNCTIONAL

## 2019-07-03 DEVICE — COMPONENT PAT DIA35MM THK10MM STD TI POLY TRABECULAR MTL: Type: IMPLANTABLE DEVICE | Site: KNEE | Status: FUNCTIONAL

## 2019-07-03 DEVICE — COMPONENT ARTC SURF PS 6-9 EF 11 MM LT TIB FIX BEAR: Type: IMPLANTABLE DEVICE | Site: KNEE | Status: FUNCTIONAL

## 2019-07-03 RX ORDER — BUDESONIDE AND FORMOTEROL FUMARATE DIHYDRATE 160; 4.5 UG/1; UG/1
2 AEROSOL RESPIRATORY (INHALATION) 2 TIMES DAILY
Status: DISCONTINUED | OUTPATIENT
Start: 2019-07-03 | End: 2019-07-03 | Stop reason: CLARIF

## 2019-07-03 RX ORDER — CELECOXIB 200 MG/1
200 CAPSULE ORAL EVERY 24 HOURS
Status: DISCONTINUED | OUTPATIENT
Start: 2019-07-04 | End: 2019-07-04 | Stop reason: HOSPADM

## 2019-07-03 RX ORDER — MORPHINE SULFATE 2 MG/ML
2 INJECTION, SOLUTION INTRAMUSCULAR; INTRAVENOUS
Status: DISCONTINUED | OUTPATIENT
Start: 2019-07-03 | End: 2019-07-04 | Stop reason: HOSPADM

## 2019-07-03 RX ORDER — MIDAZOLAM HYDROCHLORIDE 1 MG/ML
INJECTION INTRAMUSCULAR; INTRAVENOUS PRN
Status: DISCONTINUED | OUTPATIENT
Start: 2019-07-03 | End: 2019-07-03 | Stop reason: SDUPTHER

## 2019-07-03 RX ORDER — FENTANYL CITRATE 50 UG/ML
25 INJECTION, SOLUTION INTRAMUSCULAR; INTRAVENOUS EVERY 5 MIN PRN
Status: DISCONTINUED | OUTPATIENT
Start: 2019-07-03 | End: 2019-07-03 | Stop reason: HOSPADM

## 2019-07-03 RX ORDER — DOCUSATE SODIUM 100 MG/1
100 CAPSULE, LIQUID FILLED ORAL 2 TIMES DAILY
Status: DISCONTINUED | OUTPATIENT
Start: 2019-07-03 | End: 2019-07-04 | Stop reason: HOSPADM

## 2019-07-03 RX ORDER — DEXTROSE MONOHYDRATE 50 MG/ML
100 INJECTION, SOLUTION INTRAVENOUS PRN
Status: DISCONTINUED | OUTPATIENT
Start: 2019-07-03 | End: 2019-07-04

## 2019-07-03 RX ORDER — PROPOFOL 10 MG/ML
INJECTION, EMULSION INTRAVENOUS PRN
Status: DISCONTINUED | OUTPATIENT
Start: 2019-07-03 | End: 2019-07-03 | Stop reason: SDUPTHER

## 2019-07-03 RX ORDER — SODIUM CHLORIDE 9 MG/ML
INJECTION, SOLUTION INTRAVENOUS CONTINUOUS
Status: DISCONTINUED | OUTPATIENT
Start: 2019-07-03 | End: 2019-07-03

## 2019-07-03 RX ORDER — SODIUM CHLORIDE 0.9 % (FLUSH) 0.9 %
10 SYRINGE (ML) INJECTION PRN
Status: DISCONTINUED | OUTPATIENT
Start: 2019-07-03 | End: 2019-07-04 | Stop reason: HOSPADM

## 2019-07-03 RX ORDER — BUPIVACAINE HYDROCHLORIDE 7.5 MG/ML
INJECTION, SOLUTION INTRASPINAL PRN
Status: DISCONTINUED | OUTPATIENT
Start: 2019-07-03 | End: 2019-07-03 | Stop reason: SDUPTHER

## 2019-07-03 RX ORDER — SODIUM CHLORIDE 0.9 % (FLUSH) 0.9 %
10 SYRINGE (ML) INJECTION PRN
Status: DISCONTINUED | OUTPATIENT
Start: 2019-07-03 | End: 2019-07-03 | Stop reason: HOSPADM

## 2019-07-03 RX ORDER — INSULIN GLARGINE 100 [IU]/ML
0.25 INJECTION, SOLUTION SUBCUTANEOUS NIGHTLY
Status: DISCONTINUED | OUTPATIENT
Start: 2019-07-03 | End: 2019-07-03

## 2019-07-03 RX ORDER — DEXTROSE MONOHYDRATE 25 G/50ML
12.5 INJECTION, SOLUTION INTRAVENOUS PRN
Status: DISCONTINUED | OUTPATIENT
Start: 2019-07-03 | End: 2019-07-04

## 2019-07-03 RX ORDER — ONDANSETRON 2 MG/ML
4 INJECTION INTRAMUSCULAR; INTRAVENOUS
Status: DISCONTINUED | OUTPATIENT
Start: 2019-07-03 | End: 2019-07-03 | Stop reason: HOSPADM

## 2019-07-03 RX ORDER — ONDANSETRON 2 MG/ML
4 INJECTION INTRAMUSCULAR; INTRAVENOUS EVERY 6 HOURS PRN
Status: DISCONTINUED | OUTPATIENT
Start: 2019-07-03 | End: 2019-07-04 | Stop reason: HOSPADM

## 2019-07-03 RX ORDER — LIDOCAINE HYDROCHLORIDE 20 MG/ML
INJECTION, SOLUTION EPIDURAL; INFILTRATION; INTRACAUDAL; PERINEURAL PRN
Status: DISCONTINUED | OUTPATIENT
Start: 2019-07-03 | End: 2019-07-03 | Stop reason: SDUPTHER

## 2019-07-03 RX ORDER — OXYCODONE HYDROCHLORIDE 5 MG/1
10 TABLET ORAL ONCE
Status: COMPLETED | OUTPATIENT
Start: 2019-07-03 | End: 2019-07-03

## 2019-07-03 RX ORDER — OXYCODONE HYDROCHLORIDE AND ACETAMINOPHEN 5; 325 MG/1; MG/1
1 TABLET ORAL PRN
Status: DISCONTINUED | OUTPATIENT
Start: 2019-07-03 | End: 2019-07-03 | Stop reason: HOSPADM

## 2019-07-03 RX ORDER — SODIUM CHLORIDE 0.9 % (FLUSH) 0.9 %
10 SYRINGE (ML) INJECTION EVERY 12 HOURS SCHEDULED
Status: DISCONTINUED | OUTPATIENT
Start: 2019-07-03 | End: 2019-07-03 | Stop reason: HOSPADM

## 2019-07-03 RX ORDER — ALBUTEROL SULFATE 90 UG/1
1 AEROSOL, METERED RESPIRATORY (INHALATION) EVERY 6 HOURS PRN
Status: DISCONTINUED | OUTPATIENT
Start: 2019-07-03 | End: 2019-07-04 | Stop reason: HOSPADM

## 2019-07-03 RX ORDER — ASPIRIN 81 MG/1
81 TABLET ORAL 2 TIMES DAILY
Qty: 28 TABLET | Refills: 0 | Status: SHIPPED | OUTPATIENT
Start: 2019-07-03 | End: 2019-07-17

## 2019-07-03 RX ORDER — OXYCODONE HYDROCHLORIDE AND ACETAMINOPHEN 5; 325 MG/1; MG/1
2 TABLET ORAL PRN
Status: DISCONTINUED | OUTPATIENT
Start: 2019-07-03 | End: 2019-07-03 | Stop reason: HOSPADM

## 2019-07-03 RX ORDER — OXYCODONE HYDROCHLORIDE 5 MG/1
5-10 TABLET ORAL EVERY 4 HOURS PRN
Qty: 50 TABLET | Refills: 0 | Status: SHIPPED | OUTPATIENT
Start: 2019-07-03 | End: 2019-07-09 | Stop reason: SDUPTHER

## 2019-07-03 RX ORDER — OXYCODONE HYDROCHLORIDE 5 MG/1
5 TABLET ORAL EVERY 4 HOURS PRN
Status: DISCONTINUED | OUTPATIENT
Start: 2019-07-03 | End: 2019-07-04 | Stop reason: HOSPADM

## 2019-07-03 RX ORDER — PROPOFOL 10 MG/ML
INJECTION, EMULSION INTRAVENOUS CONTINUOUS PRN
Status: DISCONTINUED | OUTPATIENT
Start: 2019-07-03 | End: 2019-07-03 | Stop reason: SDUPTHER

## 2019-07-03 RX ORDER — CELECOXIB 200 MG/1
200 CAPSULE ORAL ONCE
Status: COMPLETED | OUTPATIENT
Start: 2019-07-03 | End: 2019-07-03

## 2019-07-03 RX ORDER — NICOTINE POLACRILEX 4 MG
15 LOZENGE BUCCAL PRN
Status: DISCONTINUED | OUTPATIENT
Start: 2019-07-03 | End: 2019-07-04

## 2019-07-03 RX ORDER — SODIUM CHLORIDE 450 MG/100ML
INJECTION, SOLUTION INTRAVENOUS CONTINUOUS
Status: DISCONTINUED | OUTPATIENT
Start: 2019-07-03 | End: 2019-07-04

## 2019-07-03 RX ORDER — OXYCODONE HYDROCHLORIDE 10 MG/1
10 TABLET ORAL EVERY 4 HOURS PRN
Status: DISCONTINUED | OUTPATIENT
Start: 2019-07-03 | End: 2019-07-04 | Stop reason: HOSPADM

## 2019-07-03 RX ORDER — SODIUM CHLORIDE 0.9 % (FLUSH) 0.9 %
10 SYRINGE (ML) INJECTION EVERY 12 HOURS SCHEDULED
Status: DISCONTINUED | OUTPATIENT
Start: 2019-07-03 | End: 2019-07-04 | Stop reason: HOSPADM

## 2019-07-03 RX ADMIN — ENOXAPARIN SODIUM 30 MG: 30 INJECTION SUBCUTANEOUS at 20:37

## 2019-07-03 RX ADMIN — OXYCODONE HYDROCHLORIDE 10 MG: 10 TABLET ORAL at 11:44

## 2019-07-03 RX ADMIN — Medication 2 G: at 15:57

## 2019-07-03 RX ADMIN — BUPIVACAINE HYDROCHLORIDE IN DEXTROSE 13.5 MG: 7.5 INJECTION, SOLUTION SUBARACHNOID at 07:42

## 2019-07-03 RX ADMIN — FAMOTIDINE 20 MG: 10 INJECTION, SOLUTION INTRAVENOUS at 20:20

## 2019-07-03 RX ADMIN — LIDOCAINE HYDROCHLORIDE 40 MG: 20 INJECTION, SOLUTION EPIDURAL; INFILTRATION; INTRACAUDAL; PERINEURAL at 07:46

## 2019-07-03 RX ADMIN — TRANEXAMIC ACID 1 G: 1 INJECTION, SOLUTION INTRAVENOUS at 07:50

## 2019-07-03 RX ADMIN — OXYCODONE HYDROCHLORIDE 10 MG: 10 TABLET ORAL at 15:57

## 2019-07-03 RX ADMIN — MIDAZOLAM 1 MG: 1 INJECTION INTRAMUSCULAR; INTRAVENOUS at 07:44

## 2019-07-03 RX ADMIN — Medication 2 G: at 07:43

## 2019-07-03 RX ADMIN — SODIUM CHLORIDE: 4.5 INJECTION, SOLUTION INTRAVENOUS at 11:44

## 2019-07-03 RX ADMIN — OXYCODONE HYDROCHLORIDE 10 MG: 5 TABLET ORAL at 06:46

## 2019-07-03 RX ADMIN — DOCUSATE SODIUM 100 MG: 100 CAPSULE, LIQUID FILLED ORAL at 20:20

## 2019-07-03 RX ADMIN — MOMETASONE FUROATE AND FORMOTEROL FUMARATE DIHYDRATE 2 PUFF: 200; 5 AEROSOL RESPIRATORY (INHALATION) at 20:57

## 2019-07-03 RX ADMIN — OXYCODONE HYDROCHLORIDE 10 MG: 10 TABLET ORAL at 20:20

## 2019-07-03 RX ADMIN — CELECOXIB 200 MG: 200 CAPSULE ORAL at 06:46

## 2019-07-03 RX ADMIN — PROPOFOL 140 MCG/KG/MIN: 10 INJECTION, EMULSION INTRAVENOUS at 07:46

## 2019-07-03 RX ADMIN — PROPOFOL 30 MG: 10 INJECTION, EMULSION INTRAVENOUS at 07:46

## 2019-07-03 RX ADMIN — SODIUM CHLORIDE: 9 INJECTION, SOLUTION INTRAVENOUS at 06:46

## 2019-07-03 RX ADMIN — SODIUM CHLORIDE: 9 INJECTION, SOLUTION INTRAVENOUS at 08:32

## 2019-07-03 ASSESSMENT — PAIN DESCRIPTION - ORIENTATION
ORIENTATION: LEFT

## 2019-07-03 ASSESSMENT — PULMONARY FUNCTION TESTS
PIF_VALUE: 0
PIF_VALUE: 1
PIF_VALUE: 0
PIF_VALUE: 1
PIF_VALUE: 0
PIF_VALUE: 1
PIF_VALUE: 0
PIF_VALUE: 0
PIF_VALUE: 1
PIF_VALUE: 0
PIF_VALUE: 1
PIF_VALUE: 1
PIF_VALUE: 0
PIF_VALUE: 1
PIF_VALUE: 1
PIF_VALUE: 0
PIF_VALUE: 1
PIF_VALUE: 0

## 2019-07-03 ASSESSMENT — PAIN SCALES - GENERAL
PAINLEVEL_OUTOF10: 3
PAINLEVEL_OUTOF10: 8
PAINLEVEL_OUTOF10: 0
PAINLEVEL_OUTOF10: 0
PAINLEVEL_OUTOF10: 7
PAINLEVEL_OUTOF10: 3
PAINLEVEL_OUTOF10: 7
PAINLEVEL_OUTOF10: 0
PAINLEVEL_OUTOF10: 5
PAINLEVEL_OUTOF10: 0
PAINLEVEL_OUTOF10: 0
PAINLEVEL_OUTOF10: 7
PAINLEVEL_OUTOF10: 0

## 2019-07-03 ASSESSMENT — PAIN DESCRIPTION - LOCATION
LOCATION: KNEE

## 2019-07-03 ASSESSMENT — ENCOUNTER SYMPTOMS: SHORTNESS OF BREATH: 0

## 2019-07-03 ASSESSMENT — PAIN DESCRIPTION - FREQUENCY
FREQUENCY: INTERMITTENT
FREQUENCY: CONTINUOUS
FREQUENCY: CONTINUOUS

## 2019-07-03 ASSESSMENT — PAIN DESCRIPTION - PROGRESSION
CLINICAL_PROGRESSION: GRADUALLY IMPROVING
CLINICAL_PROGRESSION: GRADUALLY IMPROVING
CLINICAL_PROGRESSION: GRADUALLY WORSENING
CLINICAL_PROGRESSION: GRADUALLY IMPROVING
CLINICAL_PROGRESSION: GRADUALLY IMPROVING

## 2019-07-03 ASSESSMENT — PAIN DESCRIPTION - PAIN TYPE
TYPE: SURGICAL PAIN

## 2019-07-03 ASSESSMENT — PAIN DESCRIPTION - ONSET
ONSET: ON-GOING

## 2019-07-03 ASSESSMENT — PAIN - FUNCTIONAL ASSESSMENT
PAIN_FUNCTIONAL_ASSESSMENT: PREVENTS OR INTERFERES SOME ACTIVE ACTIVITIES AND ADLS
PAIN_FUNCTIONAL_ASSESSMENT: PREVENTS OR INTERFERES SOME ACTIVE ACTIVITIES AND ADLS
PAIN_FUNCTIONAL_ASSESSMENT: 0-10
PAIN_FUNCTIONAL_ASSESSMENT: PREVENTS OR INTERFERES SOME ACTIVE ACTIVITIES AND ADLS

## 2019-07-03 ASSESSMENT — PAIN DESCRIPTION - DESCRIPTORS
DESCRIPTORS: BURNING

## 2019-07-03 NOTE — PROGRESS NOTES
Pt awake. Denies pain. States level of sensation at mid thigh. Able to move left toes slightly but not right.

## 2019-07-03 NOTE — H&P
Preoperative H&P Update     The patient's History and Physical in the medical record   dated 7/3/19 was reviewed by me today. Prior to Visit Medications    Medication Sig Taking? Authorizing Provider   Naproxen Sodium (ALEVE) 220 MG CAPS Take 2 tablets by mouth 2 times daily Yes Historical Provider, MD   budesonide-formoterol (SYMBICORT) 160-4.5 MCG/ACT AERO Inhale 2 puffs into the lungs 2 times daily Yes JUANITA Saldana CNP   albuterol sulfate HFA (VENTOLIN HFA) 108 (90 Base) MCG/ACT inhaler INHALE TWO PUFFS BY MOUTH EVERY 6 HOURS AS NEEDED FOR WHEEZING Yes JUANITA Saldana CNP        I reviewed the HPI, medications, allergies, reason for surgery, diagnosis and treatment plan and there has been no change. The patient was evaluated by me today. Physical exam findings for this update include:     Vitals:    07/03/19 0626   BP: 133/78   Pulse: 64   Resp: 18   Temp: 97 °F (36.1 °C)   SpO2: 97%      Airway is intact   Chest: breathing comfortably   Heart: regular rate and rhythm. Examination of the body region where surgery is to be performed shows skin is intact at the operative site.       Jerry Schultz MD    Electronically signed by Sampson Baca MD on 7/3/2019 at 7:20 AM

## 2019-07-03 NOTE — CARE COORDINATION
formerly [facility]  Patient is out of network with [address], will need to choose another agency.    Alycia Hudson LPN  CTN with  [address],  [address] [place] [address], Fax [phone]

## 2019-07-03 NOTE — BRIEF OP NOTE
Brief Postoperative Note  ______________________________________________________________    Patient: Lashawn Mujica  YOB: 1960  MRN: [id number]  Date of Procedure: 7/3/2019    Pre-Op Diagnosis: LEFT ARTHRITIS KNEE    Post-Op Diagnosis: Same       Procedure(s):  LEFT Uncemented TOTAL KNEE REPLACEMENT    Anesthesia: Monitor Anesthesia Care, Spinal    Surgeon(s):  Marlon Mcduffie MD    Assistant: Carolyn    Estimated Blood Loss (mL): less than 50     Complications: None    Specimens:   ID Type Source Tests Collected by Time Destination   A : A) Left knee bone and tissue Bone Joint, Knee SURGICAL PATHOLOGY Marlon Mcduffie MD 7/3/2019 5205        Implants:  Implant Name Type Inv.  Item Serial No.  Lot No. LRB No. Used   SCREW CORTCL ACET SLFTP 6.5X25MM Screw/Plate/Nail/Daniel SCREW CORTCL ACET SLFTP 6.5X25MM  [employer] [id number] Left 2   IMPL KNEE NEXGEN POR TM PATELLA 10MM X 35MM Knee IMPL KNEE NEXGEN POR TM PATELLA 10MM X 35MM  [employer] [id number] Left 1   IMPL KNEE PSN FEM PS POR CCR NRW SZ9 L Knee IMPL KNEE PSN FEM PS POR CCR NRW SZ9 L  [employer] [id number] Left 1   IMPL KNEE PSN TIB POR 2 PEG SZ F L Knee IMPL KNEE PSN TIB POR 2 PEG SZ F L  [employer] [id number] Left 1   IMPL KNEE ARTIC 11MM LT Knee IMPL KNEE ARTIC 11MM LT  [employer] [id number] Left 1         Drains: * No LDAs found *    Findings: OA Lateral Compartment L knee    Marlon Mcduffie MD  Date: 7/3/2019  Time: 8:28 AM

## 2019-07-03 NOTE — CARE COORDINATION
Case management:  [facility] liaison contacted to follow per JET referral.  Alfredo Miles R.N.     [phone]

## 2019-07-03 NOTE — PROGRESS NOTES
Patient/Caregiver Education & Training, ADL/Self-care Training, Stair training  Safety Devices  Type of devices:  All fall risk precautions in place, Call light within reach, Chair alarm in place, Left in chair, Nurse notified  AM-PAC Score  AM-PAC Inpatient Mobility Raw Score : 17 (07/03/19 1620)  AM-PAC Inpatient T-Scale Score : 42.13 (07/03/19 1620)  Mobility Inpatient CMS 0-100% Score: 50.57 (07/03/19 1620)  Mobility Inpatient CMS G-Code Modifier : CK (07/03/19 1620)     Goals  Short term goals  Time Frame for Short term goals: 1-2 days for discharge to home  Short term goal 1: bed mobility at [address] [place] [address] term goal 2: transfers at supervision   Short term goal 3: ambulation at supervision wbat rolling walker for household distances                -steps as needed for home entry one rail and cga  Short term goal 4: exercises at supervision  Patient Goals   Patient goals : pain relief        Therapy Time   Individual Concurrent Group Co-treatment   Time In 1345         Time Out 1430         Minutes [address] [facility], PT

## 2019-07-03 NOTE — DISCHARGE INSTR - COC
Elimination:  Urinary Catheter: None   Colostomy/Ileostomy: No  Continence:   · Bowel: Yes  · Bladder: Yes  Date of Last BM: 7/3/19    Intake/Output Summary (Last 24 hours)     Intake/Output Summary (Last 24 hours) at 7/3/2019 1345  Last data filed at 7/3/2019 1054  Gross per 24 hour   Intake 1300 ml   Output 50 ml   Net 1250 ml     Safety Concerns: At Risk for Falls    Impairments/Disabilities:      None    Nutrition Therapy:  Current Nutrition Therapy: DIET GENERAL;  Routes of Feeding: Oral  Liquids: No Restrictions  Daily Fluid Restriction: no  Last Modified Barium Swallow with Video (Video Swallowing Test): not done    Treatments at the Time of Hospital Discharge:   Respiratory Treatments:   Oxygen Therapy:  is not on home oxygen therapy. Ventilator:    - No ventilator support    Lab orders for discharge:        Rehab Therapies: Physical Therapy, Occupational Therapy and nursing care  Weight Bearing Status/Restrictions: No weight bearing restirctions  Other Medical Equipment (for information only, NOT a DME order):  Rolling walker  Other Treatments: ASA 81mg twice at day for 14 days for DVT prophylaxis, bilateral JONAS hose for 6 weeks after surgery    Patient's personal belongings (please select all that are sent with patient):  Dentures upper    RN SIGNATURE:  Electronically signed by Nika Singleton RN on 7/3/19 at 1:52 PM    PHYSICIAN SECTION    Prognosis: Good    Condition at Discharge: Stable    Rehab Potential (if transferring to Rehab): Good    Physician Certification: I certify the above orders, information, and transfer of Colin Guerrero is necessary for the continuing treatment of the diagnosis listed and that he requires [address] for less 30 days.      Update Admission H&P: No change in H&P    PHYSICIAN SIGNATURE:  Electronically signed by JUANITA Lee CNP on 7/3/19 at 1:45 PM/ Dr Gentry Dubin Status Date: ***    Gigi Readmission Risk Assessment Score:    Discharging to Facility/ Agency   · [facility]   · [id number]  [facility] ACC:[phone]    Dialysis Facility (if applicable)   · Name:  · Address:  · Dialysis Schedule:  · Phone:  · Fax:    / signature: {Sharagnature:[id number]}          Followup with Dr Hue Jeong 2 weeks after surgery in office   [phone] [address] [facility] [address] and Sports Medicine, [place]   [address],   [phone]    DISCHARGE INSTRUCTIONS FOR TOTAL KNEE REPLACEMENT  Activity:   Elevate your leg if swelling occurs in your ankle. Use elastic wraps/hose until swelling decreases.  Continue the exercise program as prescribed by physical therapists.  Take frequent walks.  Use walker, crutches, or cane with weight bearing instructions as indicated by the physical therapists.  Take rest periods often. Elevate leg during rest period. Wound Care:   Cover the wound with a sterile gauze dressing and change daily as long as there is drainage.  Do not scrub wound. Pat it dry with a soft towel.  Dont apply any lotions or creams to your wound.  Check the incision every day for redness, swelling, or increase in drainage. Diet:   You can resume your normal diet. There are no limits on your diet due to your surgery.  Pain pills and activity changes may lead to constipation. To prevent this, use prune juice or bran cereals liberally. You may need to use a laxative such as Dulcolax, Senokot, or Milk of Magnesia.  Drink plenty of fluids. Medications:   Take pain pills if needed to maintain comfort.  Never drive while taking pain medicine.  Avoid over the counter medications until checking with your doctor.  Resume previous medications as instructed by your doctor. You will be on aspirin or Eliquis  for 14 days only.  Stay off other anti-inflammatory medications (except Celebrex)  Call Your Doctor If:  [employer] You have increased pain not controlled by medications.  Excessive swelling in your ankle.  You develop numbness, tingling, or decreased movement.  You have a fever greater than 100 degrees for a day or over 101 degrees at any one time.  Your wound becomes more reddened, starts draining, or opens.  If you fall. You have any questions about your recovery. Inform your family doctor/dentist or any other doctor who cares for you in the future that you have a joint replacement. You may need antibiotics for dental or surgical procedures if there is any evidence of infection present. ? If you have required the use of insulin to control your blood sugar after surgery, follow up with your family doctor. ? Call your surgeons office to schedule your appointment to be seen after surgery. ? Make your appointment to continue physical therapy per doctors orders. ?  Smoking cessation assistance can be obtained from your family doctor or by calling [place] @ [phone]    _______________________________   _____   _______________________  ____                Patient Signature              Date      Witness                               Date

## 2019-07-03 NOTE — CARE COORDINATION
CASE MANAGEMENT:  [facility] NOT IN NETWORK. REFERRAL MADE TO [facility]. They can accept this pt. Pt notified and in agreement. Clearance Deandre WINN     [phone]

## 2019-07-03 NOTE — PROGRESS NOTES
Patient A&O. Tolerating PO. Pain controlled with pain medication (see MAR). Call light within reach. Will continue to monitor and reassess.  Electronically signed by Jony Vazquez RN on 7/3/2019

## 2019-07-03 NOTE — PROGRESS NOTES
Occupational Therapy   Occupational Therapy Initial Assessment  Date: 7/3/2019   Patient Name: Higinio Arce  MRN: [id number]     : 1960    Date of Service: 7/3/2019    Assessment: Pt is 61 y.o. M who presents with arthritis of L knee. Pt is s/p L TKA and is WBAT. PTA pt lives with family in one story home with 1 KAY. Pt reports independence in self-care, homemaking responsibilities, and functional mobility with no device. Currently, pt presents with ROM/strength in [facility] for self-care and transfers. Pt completed bed mobility, sit <> stand transfers and functional mobility with RW with CGA. Pt completed toileting with CGA and grooming tasks in stance with CGA. Anticipate pt to require min A for LB ADLs. Anticipate pt safe to d/c home with  supervision/assist from family and home health OT. Discharge Recommendations:  24 hour supervision or assist, Home with Home health OT  OT Equipment Recommendations  Equipment Needed: Yes  Other: Shower chair    HOME HEALTH CARE: LEVEL 1 STANDARD    - Initial home health evaluation to occur within 24-48 hours, in patient home   - Therapy to evaluate with goal of regaining prior level of functioning   - Therapy to evaluate if patient has [address] needs for personal care      Assessment   Performance deficits / Impairments: Decreased functional mobility ; Decreased ADL status; Decreased balance  Assessment: Pt is 61 y.o. M who presents with arthritis of L knee. Pt is s/p L TKA and is WBAT. PTA pt lives with family in one story home with 1 KAY. Pt reports independence in self-care, homemaking responsibilities, and functional mobility with no device. Currently, pt presents with ROM/strength in [facility] for self-care and transfers. Pt completed bed mobility, sit <> stand transfers and functional mobility with RW with CGA. Pt completed toileting with CGA and grooming tasks in stance with CGA. Anticipate pt to require min A for LB ADLs.  Anticipate pt safe to d/c mobility  Supine to Sit: Contact guard assistance  Sit to Supine: Unable to assess(Up in the chair at end of session)     Transfers  Sit to stand: Contact guard assistance  Stand to sit: Contact guard assistance  Transfer Comments: CGA for sit <> stand from EOB to RW and sit to recliner chair      Cognition  Overall Cognitive Status: WFL        Sensation  Overall Sensation Status: WFL        LUE AROM (degrees)  LUE AROM : WFL  Left Hand AROM (degrees)  Left Hand AROM: WFL  RUE AROM (degrees)  RUE AROM : WFL  Right Hand AROM (degrees)  Right Hand AROM: WFL     LUE Strength  Gross LUE Strength: WFL  RUE Strength  Gross RUE Strength: WFL          Plan   Plan  Times per week: 1 or 2 more sessions  Times per day: Daily  Current Treatment Recommendations: Strengthening, Functional Mobility Training, Endurance Training, Balance Training, Safety Education & Training, Equipment Evaluation, Education, & procurement, Patient/Caregiver Education & Training, Self-Care / ADL    AM-PAC Score    Oz Maguire scored a 21/24 on the AM-[facility] ADL Inpatient form. Current research shows that an AM-PAC score of 18 or greater is typically associated with a discharge to the patient's home setting. Based on the patients AM-PAC score and their current ADL deficits, it is recommended that the patient have 2-3 sessions per week of Occupational Therapy at d/c to increase the patients independence.      AM-PAC Inpatient Daily Activity Raw Score: 21 (07/03/19 1608)  AM-PAC Inpatient ADL T-Scale Score : 44.27 (07/03/19 1608)  ADL Inpatient CMS 0-100% Score: 32.79 (07/03/19 1608)  ADL Inpatient CMS G-Code Modifier : CJ (07/03/19 1608)    Goals  Short term goals  Time Frame for Short term goals: prior to d/c  Short term goal 1: Pt will complete functional mobility and transfers with SBA  Short term goal 2: Pt will complete bathing with SBA  Short term goal 3: Pt will complete dressing with SBA  Short term goal 4: Pt will complete toileting with

## 2019-07-03 NOTE — ANESTHESIA PRE PROCEDURE
J45.40    Environmental and seasonal allergies J30.89    Chronic pain of left knee M25.562, G89.29    Laryngopharyngeal reflux disease K21.9    Hoarse voice quality R49.0    Arthritis of left knee M17.12    Right rotator cuff tendonitis M75.81    Sprain of medial collateral ligament of left knee S83.412A       Past Medical History:        Diagnosis Date    Anesthesia complication     woke up during lymph node biopsy    Arthritis     Asthma     Asthmatic bronchitis    C1 cervical fracture ([facility] [address] 05/05/2019    Cancer ([facility] 23 years ago    non-hodgkins lymphoma--5 weeks of radiation    Chronic pain of left knee 6/23/2016    Environmental and seasonal allergies 06/23/2016    cats/dogs/mold/milk    Hepatitis C     Moderate persistent asthma without complication 9/37/6179       Past Surgical History:        Procedure Laterality Date    APPENDECTOMY      KNEE ARTHROSCOPY Left 09/13/2016    L knee video arthroscopy with partial lateral menisectomy and plica resection    LYMPH NODE BIOPSY      lymph node in groin area    SHOULDER SURGERY Left 1978    reconstruction of left shoulder        Social History:    Social History     Tobacco Use    Smoking status: Never Smoker    Smokeless tobacco: Never Used   Substance Use Topics    Alcohol use: Not Currently                                Counseling given: Not Answered      Vital Signs (Current):   Vitals:    07/01/19 1247 07/03/19 0626   BP:  133/78   Pulse:  64   Resp:  18   Temp:  97 °F (36.1 °C)   TempSrc:  Temporal   SpO2:  97%   Weight: 200 lb (90.7 kg) 201 lb (91.2 kg)   Height: 5' 10\" (1.778 m) 5' 10\" (1.778 m)                                              BP Readings from Last 3 Encounters:   07/03/19 133/78   06/25/19 124/76   05/21/19 119/81       NPO Status: Time of last liquid consumption: 2100                        Time of last solid consumption: 2100                        Date of last liquid consumption: 07/02/19                        Date of last solid food consumption: 07/02/19    BMI:   Wt Readings from Last 3 Encounters:   07/03/19 201 lb (91.2 kg)   06/25/19 180 lb (81.6 kg)   06/11/19 198 lb (89.8 kg)     Body mass index is 28.84 kg/m². CBC:   Lab Results   Component Value Date    WBC 6.4 06/25/2019    RBC 5.05 06/25/2019    HGB 17.1 06/25/2019    HCT 49.8 06/25/2019    MCV 98.6 06/25/2019    RDW 14.9 06/25/2019     06/25/2019       CMP:   Lab Results   Component Value Date     06/25/2019    K 4.6 06/25/2019    K 4.3 05/06/2019     06/25/2019    CO2 25 06/25/2019    BUN 12 06/25/2019    CREATININE 0.8 06/25/2019    GFRAA >60 06/25/2019    AGRATIO 1.1 05/07/2019    LABGLOM >60 06/25/2019    GLUCOSE 94 06/25/2019    PROT 7.9 05/07/2019    CALCIUM 9.4 06/25/2019    BILITOT 1.0 05/07/2019    ALKPHOS 85 05/07/2019    AST 82 05/07/2019     05/07/2019       POC Tests: No results for input(s): POCGLU, POCNA, POCK, POCCL, POCBUN, POCHEMO, POCHCT in the last 72 hours.     Coags:   Lab Results   Component Value Date    PROTIME 11.3 06/25/2019    INR 0.99 06/25/2019    APTT 43.7 06/25/2019       HCG (If Applicable): No results found for: PREGTESTUR, PREGSERUM, HCG, HCGQUANT     ABGs: No results found for: PHART, PO2ART, PDZ8SYB, MBG5GUJ, BEART, L9PHCZKU     Type & Screen (If Applicable):  No results found for: LABABO, [address]    Anesthesia Evaluation  Patient summary reviewed no history of anesthetic complications:   Airway: Mallampati: II  TM distance: >3 FB   Neck ROM: full  Mouth opening: > = 3 FB Dental:    (+) upper dentures      Pulmonary: breath sounds clear to auscultation  (+) asthma:     (-) COPD, shortness of breath, recent URI and sleep apnea                           Cardiovascular:        (-) hypertension, valvular problems/murmurs, past MI, CAD, CABG/stent, dysrhythmias,  angina,  CHF and murmur    ECG reviewed  Rhythm: regular  Rate: normal                    Neuro/Psych:   (+) neuromuscular disease:,    (-) TIA and

## 2019-07-03 NOTE — CARE COORDINATION
[facility] received referral from [facility] for [employer]. Will need PT notes and MD Orders. Will verify patient's insurance and follow up with patient to deliver the ordered item(s) prior to discharge.     Thank you for the referral.  Electronically signed by Hair Gaspar on 7/3/2019 at 1:49 PM  Cell ph# [phone]

## 2019-07-03 NOTE — PROGRESS NOTES
[facility] Orthopedic Surgery   Progress Note      S/P :  SUBJECTIVE  Up in chair. Alert and oriented. . Pain is   described in left knee and with the intensity of moderate. Pain is described as aching. OBJECTIVE              Physical                      VITALS:  /79   Pulse 56   Temp 97.5 °F (36.4 °C) (Oral)   Resp 18   Ht 5' 10\" (1.778 m)   Wt 211 lb 13.8 oz (96.1 kg)   SpO2 98%   BMI 30.40 kg/m²                     MUSCULOSKELETAL:  left foot NVI. Wiggles toes to command. Pedal pulses are palpable. NEUROLOGIC:                                  Sensory:  Touch:  Left Lower Extremity:  abnormal - tingly and numb. ACE removed and sensation then was full to foot.  Left cotton roll on and reapplied ICE pad                                                 Surgical wound appears clean and dry left knee    Data       CBC:   Lab Results   Component Value Date    WBC 6.4 06/25/2019    RBC 5.05 06/25/2019    HGB 17.1 06/25/2019    HCT 49.8 06/25/2019    MCV 98.6 06/25/2019    MCH 33.8 06/25/2019    MCHC 34.2 06/25/2019    RDW 14.9 06/25/2019     06/25/2019    MPV 9.4 06/25/2019        WBC:    Lab Results   Component Value Date    WBC 6.4 06/25/2019        Hemoglobin/Hematocrit:    Lab Results   Component Value Date    HGB 17.1 06/25/2019    HCT 49.8 06/25/2019        PT/INR:    Lab Results   Component Value Date    PROTIME 11.3 06/25/2019    INR 0.99 06/25/2019              Current Inpatient Medications             Current Facility-Administered Medications: albuterol sulfate  (90 Base) MCG/ACT inhaler 1 puff, 1 puff, Inhalation, Q6H PRN  0.45 % sodium chloride infusion, , Intravenous, Continuous  sodium chloride flush 0.9 % injection 10 mL, 10 mL, Intravenous, 2 times per day  sodium chloride flush 0.9 % injection 10 mL, 10 mL, Intravenous, PRN  ceFAZolin (ANCEF) 2 g in dextrose 5 % 100 mL IVPB, 2 g, Intravenous, Q8H  oxyCODONE (ROXICODONE) immediate release tablet 5 mg, 5 mg,

## 2019-07-04 VITALS
HEIGHT: 70 IN | WEIGHT: 205.03 LBS | RESPIRATION RATE: 16 BRPM | DIASTOLIC BLOOD PRESSURE: 74 MMHG | SYSTOLIC BLOOD PRESSURE: 121 MMHG | HEART RATE: 67 BPM | BODY MASS INDEX: 29.35 KG/M2 | TEMPERATURE: 98.1 F | OXYGEN SATURATION: 96 %

## 2019-07-04 LAB
ESTIMATED AVERAGE GLUCOSE: 116.9 MG/DL
GLUCOSE BLD-MCNC: 108 MG/DL (ref 70–99)
HBA1C MFR BLD: 5.7 %
HCT VFR BLD CALC: 47.6 % (ref 40.5–52.5)
HEMOGLOBIN: 16.5 G/DL (ref 13.5–17.5)
PERFORMED ON: ABNORMAL

## 2019-07-04 PROCEDURE — 36415 COLL VENOUS BLD VENIPUNCTURE: CPT

## 2019-07-04 PROCEDURE — 6360000002 HC RX W HCPCS: Performed by: ORTHOPAEDIC SURGERY

## 2019-07-04 PROCEDURE — 94760 N-INVAS EAR/PLS OXIMETRY 1: CPT

## 2019-07-04 PROCEDURE — 6370000000 HC RX 637 (ALT 250 FOR IP): Performed by: ORTHOPAEDIC SURGERY

## 2019-07-04 PROCEDURE — 97116 GAIT TRAINING THERAPY: CPT

## 2019-07-04 PROCEDURE — 97535 SELF CARE MNGMENT TRAINING: CPT

## 2019-07-04 PROCEDURE — 97110 THERAPEUTIC EXERCISES: CPT

## 2019-07-04 PROCEDURE — 6370000000 HC RX 637 (ALT 250 FOR IP): Performed by: NURSE PRACTITIONER

## 2019-07-04 PROCEDURE — 83036 HEMOGLOBIN GLYCOSYLATED A1C: CPT

## 2019-07-04 PROCEDURE — 97530 THERAPEUTIC ACTIVITIES: CPT

## 2019-07-04 PROCEDURE — 85014 HEMATOCRIT: CPT

## 2019-07-04 PROCEDURE — 85018 HEMOGLOBIN: CPT

## 2019-07-04 PROCEDURE — G0378 HOSPITAL OBSERVATION PER HR: HCPCS

## 2019-07-04 PROCEDURE — 96374 THER/PROPH/DIAG INJ IV PUSH: CPT

## 2019-07-04 PROCEDURE — 96372 THER/PROPH/DIAG INJ SC/IM: CPT

## 2019-07-04 RX ORDER — FAMOTIDINE 20 MG/1
20 TABLET, FILM COATED ORAL 2 TIMES DAILY
Qty: 60 TABLET | Refills: 3 | Status: SHIPPED | OUTPATIENT
Start: 2019-07-04

## 2019-07-04 RX ORDER — FAMOTIDINE 20 MG/1
20 TABLET, FILM COATED ORAL 2 TIMES DAILY
Status: DISCONTINUED | OUTPATIENT
Start: 2019-07-04 | End: 2019-07-04 | Stop reason: HOSPADM

## 2019-07-04 RX ADMIN — MORPHINE SULFATE 2 MG: 2 INJECTION, SOLUTION INTRAMUSCULAR; INTRAVENOUS at 03:55

## 2019-07-04 RX ADMIN — DOCUSATE SODIUM 100 MG: 100 CAPSULE, LIQUID FILLED ORAL at 08:10

## 2019-07-04 RX ADMIN — Medication 2 G: at 00:32

## 2019-07-04 RX ADMIN — FAMOTIDINE 20 MG: 20 TABLET, FILM COATED ORAL at 08:10

## 2019-07-04 RX ADMIN — CELECOXIB 200 MG: 200 CAPSULE ORAL at 05:48

## 2019-07-04 RX ADMIN — ENOXAPARIN SODIUM 30 MG: 30 INJECTION SUBCUTANEOUS at 08:11

## 2019-07-04 RX ADMIN — OXYCODONE HYDROCHLORIDE 10 MG: 10 TABLET ORAL at 08:10

## 2019-07-04 RX ADMIN — OXYCODONE HYDROCHLORIDE 10 MG: 10 TABLET ORAL at 00:32

## 2019-07-04 ASSESSMENT — PAIN DESCRIPTION - LOCATION
LOCATION: KNEE

## 2019-07-04 ASSESSMENT — PAIN SCALES - GENERAL
PAINLEVEL_OUTOF10: 4
PAINLEVEL_OUTOF10: 10
PAINLEVEL_OUTOF10: 4
PAINLEVEL_OUTOF10: 6
PAINLEVEL_OUTOF10: 8
PAINLEVEL_OUTOF10: 7
PAINLEVEL_OUTOF10: 4

## 2019-07-04 ASSESSMENT — PAIN DESCRIPTION - PAIN TYPE
TYPE: SURGICAL PAIN

## 2019-07-04 ASSESSMENT — PAIN DESCRIPTION - PROGRESSION
CLINICAL_PROGRESSION: GRADUALLY IMPROVING
CLINICAL_PROGRESSION: GRADUALLY WORSENING
CLINICAL_PROGRESSION: GRADUALLY WORSENING
CLINICAL_PROGRESSION: GRADUALLY IMPROVING
CLINICAL_PROGRESSION: NOT CHANGED

## 2019-07-04 ASSESSMENT — PAIN DESCRIPTION - DESCRIPTORS
DESCRIPTORS: BURNING
DESCRIPTORS: BURNING
DESCRIPTORS: ACHING
DESCRIPTORS: ACHING
DESCRIPTORS: BURNING

## 2019-07-04 ASSESSMENT — PAIN DESCRIPTION - ONSET
ONSET: ON-GOING

## 2019-07-04 ASSESSMENT — PAIN DESCRIPTION - ORIENTATION
ORIENTATION: LEFT

## 2019-07-04 ASSESSMENT — PAIN DESCRIPTION - FREQUENCY
FREQUENCY: CONTINUOUS

## 2019-07-04 NOTE — PROGRESS NOTES
2 mg, Intravenous, Q3H PRN  docusate sodium (COLACE) capsule 100 mg, 100 mg, Oral, BID  ondansetron (ZOFRAN) injection 4 mg, 4 mg, Intravenous, Q6H PRN  enoxaparin (LOVENOX) injection 30 mg, 30 mg, Subcutaneous, BID  mometasone-formoterol (DULERA) 200-5 MCG/ACT inhaler 2 puff, 2 puff, Inhalation, BID  celecoxib (CELEBREX) capsule 200 mg, 200 mg, Oral, Q24H    ASSESSMENT AND PLAN      Post left TKA, stable exam  DVT prophylaxis ordered, Lovenox as inpt then switch to ASA 81mg twice at day for 14 days for DVT prophylaxis  PT OT for ADL's and ambulation as tolerated  SS for DC planning, home with home care today, [facility]. Reviewed with patient importance of JONAS hose on daily/ off at  Night for 6 weeks as well and continue anticoagulant medication at home as ordered to reduce risk of postoperative DVT or PE clots. Pt verbalized understanding.   IV or PO pain med as ordered    Chin Be 91  7/4/2019  9:42 AM

## 2019-07-04 NOTE — PLAN OF CARE
Problem: Falls - Risk of:  Goal: Will remain free from falls  Description  Will remain free from falls  Outcome: Ongoing  Note:   Fall risk assessment completed. Fall precautions in place. Call light within reach. Pt educated on calling for assistance before getting up. Walkway free of clutter. Will continue to monitor. Problem: Falls - Risk of:  Goal: Absence of physical injury  Description  Absence of physical injury  Outcome: Ongoing  Note:   Pt is free of injury. No injury noted. Fall precautions in place. Call light within reach. Will monitor. Problem: Airway Clearance - Ineffective:  Goal: Ability to maintain a clear airway will improve  Description  Ability to maintain a clear airway will improve  Outcome: Ongoing  Note:   Pt will maintain absence of respiratory complications. Oxygen saturations >90% at all times with supplemental O2 as needed. Will assess respiratory status every shift and PRN. Encourage to cough and deep breath. Encourage HHN as ordered. Encourage IS. Problem: Mobility - Impaired:  Goal: Mobility will improve  Description  Mobility will improve  Outcome: Ongoing  Note:   Pt assessed for barriers to mobility and pt encouraged to ambulated and participate in activities as able to tolerate. Pt encouraged to ambulate and participate and emotional and physiological responses monitored to ensure patients activity level remains appropriate. Adaptive equipment as needed will continue to monitor. Problem: Infection - Surgical Site:  Goal: Will show no infection signs and symptoms  Description  Will show no infection signs and symptoms  Outcome: Ongoing  Note:   Pt is free of signs and symptoms of infection. Incision and dressing are clean, dry and intact. Vital signs stable. Will monitor.        Problem: Risk for Impaired Skin Integrity  Goal: Tissue integrity - skin and mucous membranes  Description  Structural intactness and normal physiological function of skin

## 2019-07-04 NOTE — PLAN OF CARE
Problem: Falls - Risk of:  Goal: Will remain free from falls  Description  Will remain free from falls  7/4/2019 0951 by Jodell Mcburney, RN  Outcome: Ongoing  Note:   Pt free from injury or falls at this time, fall precautions in place, bed in low position, side rail up x2, [place] Fall Risk: High (45 and higher), bed alarm on, reoriented to room and call light, reminded not to get up without assistance, call light in reach, will continue to monitor. Pt verbalizes understanding of fall risk procedures. 7/4/2019 0233 by George Thomas RN  Outcome: Ongoing  Note:   Fall risk assessment completed. Fall precautions in place. Call light within reach. Pt educated on calling for assistance before getting up. Walkway free of clutter. Will continue to monitor. Goal: Absence of physical injury  Description  Absence of physical injury  7/4/2019 0951 by Jodell Mcburney, RN  Outcome: Ongoing  Note:   Pt has not incurred any type of injury this shift. 7/4/2019 0233 by George Thomas RN  Outcome: Ongoing  Note:   Pt is free of injury. No injury noted. Fall precautions in place. Call light within reach. Will monitor. Problem: Airway Clearance - Ineffective:  Goal: Ability to maintain a clear airway will improve  Description  Ability to maintain a clear airway will improve  7/4/2019 0951 by Jodell Mcburney, RN  Outcome: Ongoing  Note:   Pt's airway remains patent with no SOB or difficulty breathing noted. 7/4/2019 0233 by George Thomas RN  Outcome: Ongoing  Note:   Pt will maintain absence of respiratory complications. Oxygen saturations >90% at all times with supplemental O2 as needed. Will assess respiratory status every shift and PRN. Encourage to cough and deep breath. Encourage HHN as ordered. Encourage IS. Problem: Gas Exchange - Impaired:  Goal: Levels of oxygenation will improve  Description  Levels of oxygenation will improve  Outcome: Ongoing  Note:   Pt's O2 sats are at 96% on RA.      Problem: Discharge

## 2019-07-04 NOTE — PROGRESS NOTES
Physical Therapy  Facility/Department: [address] ORTHOPEDICS  Daily Treatment Note/Discharge Summary  NAME: Cristin Arango  : 1960  MRN: [id number]    Date of Service: 2019    Discharge Recommendations:  Home with assist PRN, Home with Home health PT   PT Equipment Recommendations  Equipment Needed: Yes  Mobility Devices: Cooter Poisson: Lay Arango scored a 18/24 on the AM-PAC short mobility form. Current research shows that an AM-PAC score of 18 or greater is typically associated with a discharge to the patient's home setting. Based on the patients AM-PAC score and their current functional mobility deficits, it is recommended that the patient have 2-3 sessions per week of Physical Therapy at d/c to increase the patients independence. Assessment   Assessment: Pt s/p L TKR, home with adequate family assist/support. [facility] issued, recommend Home PT Services upon d/c. Prognosis: Good  Decision Making: Medium Complexity  History: See above. Exam: See above. Clinical Presentation: See above. Patient Education: Role of PT, POC, Need to call for assist, Safe use of Walker, LE ROM Exs. Barriers to Learning: None. REQUIRES PT FOLLOW UP: No  Activity Tolerance  Activity Tolerance: Patient Tolerated treatment well     Patient Diagnosis(es): The encounter diagnosis was Arthritis of left knee. has a past medical history of Anesthesia complication, Arthritis, Asthma, C1 cervical fracture ([facility] [place] [address] Cancer ([facility] [place] [address] Chronic pain of left knee, Environmental and seasonal allergies, Hepatitis C, and Moderate persistent asthma without complication. has a past surgical history that includes Appendectomy; shoulder surgery (Left, ); lymph node biopsy; Knee arthroscopy (Left, 2016); and Total knee arthroplasty (Left, 7/3/2019).     Restrictions  Restrictions/Precautions  Restrictions/Precautions: Fall Risk  Position Activity Restriction  Other position/activity restrictions: light within reach, Left in chair, Nurse notified     Therapy Time   Individual Concurrent Group Co-treatment   Time In           Time Out           Minutes  [address] Aristides Chung, PT

## 2019-07-04 NOTE — PROGRESS NOTES
Pt resting in bed at beginning of shift. He c/o L knee pain rated 8/10 with movement PRN Oxycodone administered. He denies having any nausea, numbness, or tingling. Pulses palpable, skin warm to touch. He has strong flexion in BLE. Prineo intact to L knee with no drainage noted. Pt does report pain behind his knee that is constant. No redness or heat noted on assessment. Pt receiving scheduled Lovenox and has BLE Bishop hose and SCD's in place. Fall precautions in place, belongings within reach. Will continue to monitor and assess.

## 2019-07-05 ENCOUNTER — TELEPHONE (OUTPATIENT)
Dept: ORTHOPEDIC SURGERY | Age: 59
End: 2019-07-05

## 2019-07-05 ENCOUNTER — TELEPHONE (OUTPATIENT)
Dept: INTERNAL MEDICINE CLINIC | Age: 59
End: 2019-07-05

## 2019-07-05 NOTE — TELEPHONE ENCOUNTER
She is calling to see if they should be using the ice machine should that be around the clock. She wanted to make sure that they were using it correctly  3 x a week for 3 weeks she will be seeing him.

## 2019-07-08 ENCOUNTER — TELEPHONE (OUTPATIENT)
Dept: INTERNAL MEDICINE CLINIC | Age: 59
End: 2019-07-08

## 2019-07-08 ENCOUNTER — TELEPHONE (OUTPATIENT)
Dept: ORTHOPEDICS UNIT | Age: 59
End: 2019-07-08

## 2019-07-09 ENCOUNTER — TELEPHONE (OUTPATIENT)
Dept: ORTHOPEDIC SURGERY | Age: 59
End: 2019-07-09

## 2019-07-09 DIAGNOSIS — M17.12 ARTHRITIS OF LEFT KNEE: ICD-10-CM

## 2019-07-09 RX ORDER — OXYCODONE HYDROCHLORIDE 5 MG/1
5-10 TABLET ORAL EVERY 4 HOURS PRN
Qty: 50 TABLET | Refills: 0 | Status: SHIPPED | OUTPATIENT
Start: 2019-07-09 | End: 2019-07-16 | Stop reason: SDUPTHER

## 2019-07-09 NOTE — TELEPHONE ENCOUNTER
Patient mom Hannah called to obtain refill of Oxycodone. She also states that patient has been constipated. They have used Myrolax but patient has not had much success. She is wondering what Dr. Nyasia Wise would recommend?     Pharmacy;  [facility]: [phone]    Please call Hannah:  [phone]

## 2019-07-14 RX ORDER — INDOMETHACIN 50 MG/1
50 CAPSULE ORAL 3 TIMES DAILY
Qty: 60 CAPSULE | Refills: 3 | Status: SHIPPED | OUTPATIENT
Start: 2019-07-14 | End: 2020-09-21 | Stop reason: SDUPTHER

## 2019-07-16 ENCOUNTER — TELEPHONE (OUTPATIENT)
Dept: ORTHOPEDIC SURGERY | Age: 59
End: 2019-07-16

## 2019-07-16 DIAGNOSIS — M17.12 ARTHRITIS OF LEFT KNEE: ICD-10-CM

## 2019-07-17 RX ORDER — OXYCODONE HYDROCHLORIDE 5 MG/1
5 TABLET ORAL EVERY 4 HOURS PRN
Qty: 42 TABLET | Refills: 0 | Status: SHIPPED | OUTPATIENT
Start: 2019-07-17 | End: 2019-07-25 | Stop reason: SDUPTHER

## 2019-07-18 ENCOUNTER — OFFICE VISIT (OUTPATIENT)
Dept: ORTHOPEDIC SURGERY | Age: 59
End: 2019-07-18

## 2019-07-18 VITALS — WEIGHT: 201 LBS | BODY MASS INDEX: 28.77 KG/M2 | TEMPERATURE: 97.4 F | RESPIRATION RATE: 16 BRPM | HEIGHT: 70 IN

## 2019-07-18 DIAGNOSIS — Z96.652 STATUS POST TOTAL LEFT KNEE REPLACEMENT: Primary | ICD-10-CM

## 2019-07-18 PROCEDURE — 99024 POSTOP FOLLOW-UP VISIT: CPT | Performed by: ORTHOPAEDIC SURGERY

## 2019-07-23 ENCOUNTER — HOSPITAL ENCOUNTER (OUTPATIENT)
Dept: PHYSICAL THERAPY | Age: 59
Setting detail: THERAPIES SERIES
Discharge: HOME | End: 2019-07-23
Payer: MEDICARE

## 2019-07-23 PROCEDURE — 97162 PT EVAL MOD COMPLEX 30 MIN: CPT

## 2019-07-23 PROCEDURE — 97140 MANUAL THERAPY 1/> REGIONS: CPT

## 2019-07-23 PROCEDURE — 97110 THERAPEUTIC EXERCISES: CPT

## 2019-07-23 ASSESSMENT — PAIN DESCRIPTION - PAIN TYPE: TYPE: SURGICAL PAIN

## 2019-07-23 ASSESSMENT — PAIN DESCRIPTION - ORIENTATION: ORIENTATION: LEFT

## 2019-07-23 ASSESSMENT — PAIN DESCRIPTION - FREQUENCY: FREQUENCY: CONTINUOUS

## 2019-07-23 ASSESSMENT — PAIN SCALES - GENERAL: PAINLEVEL_OUTOF10: 8

## 2019-07-23 ASSESSMENT — PAIN DESCRIPTION - ONSET: ONSET: GRADUAL

## 2019-07-23 ASSESSMENT — PAIN DESCRIPTION - PROGRESSION: CLINICAL_PROGRESSION: NOT CHANGED

## 2019-07-23 ASSESSMENT — PAIN - FUNCTIONAL ASSESSMENT: PAIN_FUNCTIONAL_ASSESSMENT: PREVENTS OR INTERFERES SOME ACTIVE ACTIVITIES AND ADLS

## 2019-07-23 ASSESSMENT — PAIN DESCRIPTION - DESCRIPTORS: DESCRIPTORS: BURNING;SHARP

## 2019-07-23 ASSESSMENT — PAIN DESCRIPTION - LOCATION: LOCATION: KNEE

## 2019-07-23 NOTE — PLAN OF CARE
Outpatient Physical Therapy  [] [facility]    Phone: [phone]   Fax: [phone]   [x] [facility]  Phone: [phone]              Fax: [phone]  [] [facility]   Phone: [phone]   Fax: [phone]     To: Referring Practitioner: Dr. Monroe Henriquez      Patient: Pee Lala   : 1960   MRN: [id number]  Evaluation Date: 2019      Diagnosis Information:  · Diagnosis: S/P Left TKR - DOS 7/3/19   · Treatment Diagnosis: Limited Knee ROM, weakness, difficulty with walking, pain due to surgery. Physical Therapy Certification/Re-Certification Form  Dear George Barbosa,  The following patient has been evaluated for physical therapy services and for therapy to continue, Medicare requires monthly physician review of the treatment plan. Please review the attached evaluation and/or summary of the patient's plan of care, and verify that you agree therapy should continue by signing the attached document and sending it back to our office. Plan of Care/Treatment to date:  [x] Therapeutic Exercise    [x] Modalities:  [x] Therapeutic Activity     [] Ultrasound  [] Electrical Stimulation  [x] Gait Training      [] Cervical Traction [] Lumbar Traction  [] Neuromuscular Re-education    [] Cold/hotpack [] Iontophoresis   [x] Instruction in HEP     Other:  [x] Manual Therapy      []             [] Aquatic Therapy      []           ? Frequency/Duration:  # Days per week: [] 1 day # Weeks: [] 1 week [] 5 weeks     [x] 2 days? [] 2 weeks [x] 6 weeks     [x] 3 days   [] 3 weeks [] 7 weeks     [] 4 days   [x] 4 weeks [] 8 weeks    Rehab Potential: [] Excellent [] Good [x] Fair  [] Poor     Dr. Monroe Henriquez:  ROM of left knee on initial evaluation was -8-79. Will keep monitoring and let your office know if he does not reach 90 degrees of flexion within 4 weeks post op.    -Electronically signed by:  Isabel Choi PT      If you have any questions or concerns, please don't hesitate to call.   Thank you for your

## 2019-07-23 NOTE — FLOWSHEET NOTE
instructions for home exercises as above. Patient performs them correctly and understands purpose. Manual Treatments:    Patellar mobs, retrorgrade massage to decrease edema, and STM to quad x 10 min    Modalities: Will ice at home today. Timed Code Treatment Minutes:    25  Total Treatment Minutes:    70    Treatment/Activity Tolerance:  [] Patient tolerated treatment well [] Patient limited by fatigue  [x] Patient limited by pain  [] Patient limited by other medical complications  [x] Other: Discussed range requirements per Dr. Hue Jeong by Week 4 post op and discussed CYNDI and pt is not interested.     Prognosis: [x] Good [x] Fair  [] Poor    Goals:    Short term goals  Time Frame for Short term goals: 4 weeks  Short term goal 1: Pt will increase left knee ROM to 0-115 or more  Short term goal 2: Pt will increase strength of left quads and hams to 4+/5  Short term goal 3: Pt will ambulate well with SPC without deviation  Short term goal 4: Pt will note less pain in surgical area to 3/10 or less              Patient Requires Follow-up: [x] Yes  [] No    Plan:   [] Continue per plan of care [] Alter current plan (see comments)  [x] Plan of care initiated [] Hold pending MD visit [] Discharge    Plan for Next Session: Nu  step and leg press next time    Electronically signed by:  Julia Bardales, PT,

## 2019-07-23 NOTE — PROGRESS NOTES
term goal 4: Pt will note less pain in surgical area to 3/10 or less  Patient Goals   Patient goals : \"Walking, no pain, drive\"       Therapy Time   Individual Concurrent Group Co-treatment   Time In 1010         Time Out 1120         Minutes 70         Timed Code Treatment Minutes: [facility], PT

## 2019-07-25 ENCOUNTER — TELEPHONE (OUTPATIENT)
Dept: ORTHOPEDIC SURGERY | Age: 59
End: 2019-07-25

## 2019-07-25 DIAGNOSIS — M17.12 ARTHRITIS OF LEFT KNEE: ICD-10-CM

## 2019-07-25 RX ORDER — OXYCODONE HYDROCHLORIDE 5 MG/1
5 TABLET ORAL EVERY 4 HOURS PRN
Qty: 42 TABLET | Refills: 0 | Status: SHIPPED | OUTPATIENT
Start: 2019-07-25 | End: 2019-08-01 | Stop reason: SDUPTHER

## 2019-07-25 NOTE — TELEPHONE ENCOUNTER
Patient called to obtain a refill of Oxycodone.     Pharmacy:  [facility]: [phone]    Please call patient:  [phone]

## 2019-07-26 ENCOUNTER — HOSPITAL ENCOUNTER (OUTPATIENT)
Dept: PHYSICAL THERAPY | Age: 59
Setting detail: THERAPIES SERIES
Discharge: HOME | End: 2019-07-26
Payer: MEDICARE

## 2019-07-26 PROCEDURE — 97110 THERAPEUTIC EXERCISES: CPT

## 2019-07-26 PROCEDURE — 97016 VASOPNEUMATIC DEVICE THERAPY: CPT

## 2019-07-26 PROCEDURE — 97140 MANUAL THERAPY 1/> REGIONS: CPT

## 2019-07-26 NOTE — FLOWSHEET NOTE
[address] and [facility], [place]                                                         Physical Therapy Daily Treatment Note  Date:  2019    Patient Name:  Umm Campbell    :  1960  MRN: [id number]    Medical/Treatment Diagnosis Information:  · Diagnosis: S/P Left TKR - DOS 7/3/19  · Treatment Diagnosis: M25.562 left knee pain, M25.662 L knee stiffness      Insurance/Certification information:  PT Insurance Information: [employer]  Physician Information:  Referring Practitioner: Dr. Zak Mcgraw    Date of Patient follow up with Physician:     Functional Assessment:  19  Functional Assessment scale used: WOMAC  Score: 47%    Progress Note: [x]  Yes  []  No  Next due by: Visit #10       Latex Allergy:  [x]NO      []YES  Preferred Language for Healthcare:   [x]English       []other:    Visit # Insurance Allowable   2 30     Pain level:  5-6/10 currently;     SUBJECTIVE:   Pt had L TKA performed on  by Dr. Zak Mcgraw. He had home therapy for 2 weeks following sx. He notes that yesterday he got into his pool and tried to do bending standing on the steps. He showered/cleaned incision, iced and took a nap. He reports that when woke up his knee was much more swollen and sore and he is discouraged that he is going in the wrong direction. He was planning on moving to [facility] after this sx and working with horses again. He notes this would entail long walking, riding ATV, carrying and lifting 50lb feed bags. Prior to sx he would have difficulty with driving long periods as knee would be stiff getting out of truck, walking longer distances and stair climbing.      OBJECTIVE: 19    Observation: gait; single point cane, decreased TKE, decreased knee flexion LLE    Test measurements:    Girth:  Effusion  RIGHT LEFT   10cm superior to patella     Midline patella     10cm inferior to patella           Flexibility L R (92038)  [] ES(attended) (67316)      [] ES (un) (16616):       GOALS:  Patient stated goal: return to normal daily activities. Therapist goals for Patient:   Short Term Goals: To be achieved in: 2 weeks  1. Independent in HEP and progression per patient tolerance, in order to prevent re-injury. 2. Patient will have a decrease in pain to facilitate improvement in movement, function, and ADLs as indicated by Functional Deficits. Long Term Goals: To be achieved in: 10-12 weeks  1. Disability index score of 19% or less for the [facility] to assist with reaching prior level of function. 2. Patient will demonstrate increased AROM to 120+ flexion, 0 extension  to allow for proper joint functioning as indicated by patients Functional Deficits. 3. Patient will demonstrate an increase in Strength to 4/5 or greater  to allow for proper functional mobility as indicated by patients Functional Deficits. 4. Patient will return to ambulation on all surfaces without increased symptoms or restriction. 5. Patient will return to driving without increased symptoms or restriction. Progression Towards Functional goals:  [] Patient is progressing as expected towards functional goals listed. [] Progression is slowed due to complexities listed. [] Progression has been slowed due to co-morbidities. [x] Plan just implemented, too soon to assess goals progression  [] Other:     ASSESSMENT:  See eval    Treatment/Activity Tolerance:  [] Patient tolerated treatment well [] Patient limited by fatique  [] Patient limited by pain  [] Patient limited by other medical complications  [] Other: Pt shows improving ROM into flexion but still limited with extension so performed exercises propped today. He notes increased soreness after ext and flex activities. Was instructed to perform patellear mobs at home. Compression garment given to help reduce swelling at joint. Continue to progress as tolerated.      Reviewed insurance

## 2019-07-29 ENCOUNTER — HOSPITAL ENCOUNTER (OUTPATIENT)
Dept: PHYSICAL THERAPY | Age: 59
Setting detail: THERAPIES SERIES
Discharge: HOME | End: 2019-07-29
Payer: MEDICARE

## 2019-07-29 ENCOUNTER — APPOINTMENT (OUTPATIENT)
Dept: PHYSICAL THERAPY | Age: 59
End: 2019-07-29
Payer: MEDICARE

## 2019-07-29 PROCEDURE — 97110 THERAPEUTIC EXERCISES: CPT

## 2019-07-29 PROCEDURE — 97112 NEUROMUSCULAR REEDUCATION: CPT

## 2019-07-29 PROCEDURE — 97016 VASOPNEUMATIC DEVICE THERAPY: CPT

## 2019-07-29 NOTE — FLOWSHEET NOTE
weeks  1. Disability index score of 19% or less for the [facility] to assist with reaching prior level of function. 2. Patient will demonstrate increased AROM to 120+ flexion, 0 extension  to allow for proper joint functioning as indicated by patients Functional Deficits. 3. Patient will demonstrate an increase in Strength to 4/5 or greater  to allow for proper functional mobility as indicated by patients Functional Deficits. 4. Patient will return to ambulation on all surfaces without increased symptoms or restriction. 5. Patient will return to driving without increased symptoms or restriction. Progression Towards Functional goals:  [] Patient is progressing as expected towards functional goals listed. [] Progression is slowed due to complexities listed. [] Progression has been slowed due to co-morbidities. [x] Plan just implemented, too soon to assess goals progression  [] Other:     ASSESSMENT:  See eval    Treatment/Activity Tolerance:  [] Patient tolerated treatment well [] Patient limited by fatique  [] Patient limited by pain  [] Patient limited by other medical complications  [] Other: Pt shows no improvement in ROM so tried wall slides. Pt is limited in progressing himself d/t pain. We discussed the importance of trying to push through some pain in order to progress motion. Continue to progress as tolerated. Reviewed insurance benefits for physical therapy in an outpatient hospital based setting with the patient, including deductible of 0 and allowable visit number. Pt was informed of possible out of pocket costs, as well as, informed of other service options such as the [facility] program to assist with cost and/or limited visit number.      Prognosis: [] Good [] Fair  [] Poor    Patient Requires Follow-up: [x] Yes  [] No    PLAN: See eval  [] Continue per plan of care [] Alter current plan (see comments)  [x] Plan of care initiated [] Hold pending MD visit [] Discharge  If patient does not

## 2019-07-31 ENCOUNTER — HOSPITAL ENCOUNTER (OUTPATIENT)
Dept: PHYSICAL THERAPY | Age: 59
Setting detail: THERAPIES SERIES
Discharge: HOME | End: 2019-07-31
Payer: MEDICARE

## 2019-07-31 PROCEDURE — 97112 NEUROMUSCULAR REEDUCATION: CPT

## 2019-07-31 PROCEDURE — 97116 GAIT TRAINING THERAPY: CPT

## 2019-07-31 PROCEDURE — 97110 THERAPEUTIC EXERCISES: CPT

## 2019-08-01 ENCOUNTER — OFFICE VISIT (OUTPATIENT)
Dept: ORTHOPEDIC SURGERY | Age: 59
End: 2019-08-01

## 2019-08-01 VITALS — TEMPERATURE: 97 F

## 2019-08-01 DIAGNOSIS — M17.12 ARTHRITIS OF LEFT KNEE: ICD-10-CM

## 2019-08-01 DIAGNOSIS — Z96.652 STATUS POST TOTAL LEFT KNEE REPLACEMENT: Primary | ICD-10-CM

## 2019-08-01 PROCEDURE — 99024 POSTOP FOLLOW-UP VISIT: CPT | Performed by: PHYSICIAN ASSISTANT

## 2019-08-01 RX ORDER — CYCLOBENZAPRINE HCL 5 MG
5 TABLET ORAL NIGHTLY PRN
Qty: 30 TABLET | Refills: 0 | Status: SHIPPED | OUTPATIENT
Start: 2019-08-01 | End: 2019-08-15 | Stop reason: SDUPTHER

## 2019-08-01 RX ORDER — OXYCODONE HYDROCHLORIDE 5 MG/1
5 TABLET ORAL EVERY 4 HOURS PRN
Qty: 42 TABLET | Refills: 0 | Status: SHIPPED | OUTPATIENT
Start: 2019-08-01 | End: 2019-08-08 | Stop reason: SDUPTHER

## 2019-08-02 ENCOUNTER — HOSPITAL ENCOUNTER (OUTPATIENT)
Dept: PHYSICAL THERAPY | Age: 59
Setting detail: THERAPIES SERIES
Discharge: HOME | End: 2019-08-02
Payer: MEDICARE

## 2019-08-02 PROCEDURE — 97110 THERAPEUTIC EXERCISES: CPT | Performed by: PHYSICAL THERAPIST

## 2019-08-02 PROCEDURE — 97116 GAIT TRAINING THERAPY: CPT | Performed by: PHYSICAL THERAPIST

## 2019-08-02 PROCEDURE — 97112 NEUROMUSCULAR REEDUCATION: CPT | Performed by: PHYSICAL THERAPIST

## 2019-08-02 NOTE — FLOWSHEET NOTE
ITB      Quad                  08/02/19   ROM PROM  AROM Overpressure Comment    L R L R L R    Flexion 89 ERMI; 94 heel slides         Extension   -6 with quad set (after stretches); -5 after prone hang                                 Strength L R Comment   Quad Poor voluntary quad set      Hamstring      Gastroc      Hip flexor      Hip ABD                      RESTRICTIONS/PRECAUTIONS: sp L knee TKA 7/03    Exercises/Interventions:     Exercise/Equipment Resistance/Repetitions Other comments   Stretching     Hamstring [id number] propped    Hip Flexion     ITB     Grion     Quad     Inclined Calf     Towel Pull [id number] propped         SLR     Supine 3x10  Mild lag; cues to reset quad and straighten knee between reps    Prone     Abduction 3x10     Adducton     SLR+     Clams     LAQ 3x10               Isometrics     Quad sets 10x10     Hip Adduction     Hamstring                    Patellar Glides     Medial 3'    Superior 3'    Inferior 3'         ROM     Sheet Pulls [id number] 94 deg   Edge of bed     Flexionator [id number] 89 deg   Extensionator     Hang Weights Prone 2 x 3 mins with 2 lbs    Ankle Pumps                              CKC     Calf raises 2x10     Wall sits     Step ups     Step up and over     Lateral Step Downs               Mini squats X 10     CC TKE     CC TKE 3 x 10 L 4    Lateral band walks     Side Planks     Half moon     Single leg flow          Biodex-balance     Single leg stance 10x10    Plyoback          Stool scoots     SB bridge     SB HS Curls     Planks          PRE     Extension  RANGE: 90/40   Flexion  RANGE: 0/90        Cable Column          Leg Press  RANGE: 70/10        Bike Attempted to go all the way around on both bikes but unable to so pt refused to perform     Treadmill          Cone walks  5 laps with straight cane with cones and x 3 laps without cones  trying to exaggerate TKE with heel strike          Therapeutic Exercise and NMR EXR  [x] (26499) Provided verbal/tactile cueing for activities related to strengthening, flexibility, endurance, ROM for improvements in LE, proximal hip, and core control with self care, mobility, lifting, ambulation.  [] (40293) Provided verbal/tactile cueing for activities related to improving balance, coordination, kinesthetic sense, posture, motor skill, proprioception  to assist with LE, proximal hip, and core control in self care, mobility, lifting, ambulation and eccentric single leg control.      NMR and Therapeutic Activities:    [x] (42935 or 62732) Provided verbal/tactile cueing for activities related to improving balance, coordination, kinesthetic sense, posture, motor skill, proprioception and motor activation to allow for proper function of core, proximal hip and LE with self care and ADLs  [x] (25231) Gait Re-education- Provided training and instruction to the patient for proper LE, core and proximal hip recruitment and positioning and eccentric body weight control with ambulation re-education including up and down stairs     Home Exercise Program:    [x] (47425) Reviewed/Progressed HEP activities related to strengthening, flexibility, endurance, ROM of core, proximal hip and LE for functional self-care, mobility, lifting and ambulation/stair navigation   [] (52653)Reviewed/Progressed HEP activities related to improving balance, coordination, kinesthetic sense, posture, motor skill, proprioception of core, proximal hip and LE for self care, mobility, lifting, and ambulation/stair navigation      Manual Treatments:  PROM / STM / Oscillations-Mobs:  G-I, II, III, IV (PA's, Inf., Post.)  [] (47548) Provided manual therapy to mobilize LE, proximal hip and/or LS spine soft tissue/joints for the purpose of modulating pain, promoting relaxation,  increasing ROM, reducing/eliminating soft tissue swelling/inflammation/restriction, improving soft tissue extensibility and allowing for proper ROM for normal function with self care, mobility, lifting and other medical complications  [x] Other: Pt continues to be frustrated with his post op pain and his difficulty with motion. He was able to increase flex to 94 today with encouragement and was encouraged to push himself this hard at home also. Pt was able to add in prone hangs today to work on extn ROM. Pt refused to do bike for ROM if he cannot make it all the way around as he feels this just makes him more tight and painful; was not able to make it all the way around on bikes in clinic here. Pt did need cues to increase quad activation with SLRs and to increase knee extn with ambulation. Pt given pictures of prone and supine/seated weight hang to work on extn at home. Reviewed insurance benefits for physical therapy in an outpatient hospital based setting with the patient, including deductible of 0 and allowable visit number. Pt was informed of possible out of pocket costs, as well as, informed of other service options such as the [facility] program to assist with cost and/or limited visit number. Prognosis: [] Good [] Fair  [] Poor    Patient Requires Follow-up: [x] Yes  [] No    PLAN: See eval; consider PT OP into extn or extensionator, more squats  [x] Continue per plan of care [] Alter current plan (see comments)  [] Plan of care initiated [] Hold pending MD visit [] Discharge  If patient does not return, this shall be considered DC summary.      Electronically signed by:   Vamsi Warner, DPT [id number]

## 2019-08-05 ENCOUNTER — HOSPITAL ENCOUNTER (OUTPATIENT)
Dept: PHYSICAL THERAPY | Age: 59
Setting detail: THERAPIES SERIES
Discharge: HOME | End: 2019-08-05
Payer: MEDICARE

## 2019-08-05 PROCEDURE — 97530 THERAPEUTIC ACTIVITIES: CPT | Performed by: PHYSICAL THERAPIST

## 2019-08-05 PROCEDURE — 97112 NEUROMUSCULAR REEDUCATION: CPT | Performed by: PHYSICAL THERAPIST

## 2019-08-05 PROCEDURE — 97110 THERAPEUTIC EXERCISES: CPT | Performed by: PHYSICAL THERAPIST

## 2019-08-05 NOTE — FLOWSHEET NOTE
after prone hang                                 Strength L R Comment   Quad Poor voluntary quad set      Hamstring      Gastroc      Hip flexor      Hip ABD                      RESTRICTIONS/PRECAUTIONS: sp L knee TKA 7/03    Exercises/Interventions:     Exercise/Equipment Resistance/Repetitions Other comments   Stretching     Hamstring [id number] propped    Hip Flexion     ITB     Grion     Quad     Inclined Calf 5 x 30 secs    Towel Pull [id number] propped         SLR     Supine 3x10  Mild lag; cues to reset quad and straighten knee between reps    Prone     Abduction 3x10     Adducton     SLR+     Clams     LAQ            Isometrics    Quad sets    Hip Adduction     Hamstring                    Patellar Glides     Medial 3'    Superior 3'    Inferior 3'         ROM     Sheet Pulls [id number] 93 deg   Edge of bed     Flexionator [id number] 89 deg   Extensionator 5 x 30 secs    Hang Weights Prone 2 x 3 mins with 3 lbs    Ankle Pumps                              CKC     Calf raises 3x10     Wall sits     Step ups     Step up and over     Lateral Step Downs               Mini squats 2 x 15     CC TKE     CC TKE 3 x 10 L 4    Lateral band walks     Side Planks     Half moon     Single leg flow          Biodex-balance     Single leg stance 10x10 secs    Plyoback          Stool scoots     SB bridge     SB HS Curls     Planks          PRE     Extension  RANGE: 90/40   Flexion  RANGE: 0/90        Cable Column          Leg Press  RANGE: 70/10        Bike    Treadmill        Cone walks   trying to exaggerate TKE with heel strike          Therapeutic Exercise and NMR EXR  [x] (84019) Provided verbal/tactile cueing for activities related to strengthening, flexibility, endurance, ROM for improvements in LE, proximal hip, and core control with self care, mobility, lifting, ambulation.  [] (93023) Provided verbal/tactile cueing for activities related to improving balance, coordination, kinesthetic sense, posture, motor skill,

## 2019-08-07 ENCOUNTER — HOSPITAL ENCOUNTER (OUTPATIENT)
Dept: PHYSICAL THERAPY | Age: 59
Setting detail: THERAPIES SERIES
Discharge: HOME | End: 2019-08-07
Payer: MEDICARE

## 2019-08-07 PROCEDURE — 97110 THERAPEUTIC EXERCISES: CPT

## 2019-08-07 PROCEDURE — 97530 THERAPEUTIC ACTIVITIES: CPT

## 2019-08-07 PROCEDURE — 97112 NEUROMUSCULAR REEDUCATION: CPT

## 2019-08-07 NOTE — FLOWSHEET NOTE
[address] [facility], [place]                                                         Physical Therapy Daily Treatment Note  Date:  2019    Patient Name:  Carlos Manuel Valente    :  1960  MRN: [id number]    Medical/Treatment Diagnosis Information:  · Diagnosis: S/P Left TKR - DOS 7/3/19  · Treatment Diagnosis: M25.562 left knee pain, M25.662 L knee stiffness      Insurance/Certification information:  PT Insurance Information: [employer]  Physician Information:  Referring Practitioner: Dr. Mason Hernandez    Date of Patient follow up with Physician:     Functional Assessment:  19  Functional Assessment scale used: WOMAC  Score: 47%    Progress Note: [x]  Yes  []  No  Next due by: Visit #10       Latex Allergy:  [x]NO      []YES  Preferred Language for Healthcare:   [x]English       []other:    Visit # Insurance Allowable        Pain level:  2/10 currently;     SUBJECTIVE:   Pt reports he is feeling somewhat better, feels like he can walk around with less pain and doesn't feel he needs the cane as much. He notes that after he does HEP he still swells but ice and elevation helps to decrease that. He put his ice pack around ankle to add some weight to prone hangs and felt like this helped.       OBJECTIVE: 19    Observation: gait; single point cane, decreased TKE, decreased knee flexion LLE    Test measurements:    Girth:  Effusion  RIGHT LEFT   10cm superior to patella     Midline patella  40.5cm  42.25cm   10cm inferior to patella           Flexibility L R Comment   Hamstring      Gastroc      ITB      Quad                  19   ROM PROM  AROM Overpressure Comment    L R L R L R    Flexion 92 ERMI; 94 heel slides         Extension   -3 with quad set (after stretches); -3 after prone hang                                 Strength L R Comment   Quad Poor voluntary quad set      Hamstring      Gastroc NMR and Therapeutic Activities:    [x] (30553 or 89648) Provided verbal/tactile cueing for activities related to improving balance, coordination, kinesthetic sense, posture, motor skill, proprioception and motor activation to allow for proper function of core, proximal hip and LE with self care and ADLs  [x] (21532) Gait Re-education- Provided training and instruction to the patient for proper LE, core and proximal hip recruitment and positioning and eccentric body weight control with ambulation re-education including up and down stairs     Home Exercise Program:    [x] (96671) Reviewed/Progressed HEP activities related to strengthening, flexibility, endurance, ROM of core, proximal hip and LE for functional self-care, mobility, lifting and ambulation/stair navigation   [] (94726)Reviewed/Progressed HEP activities related to improving balance, coordination, kinesthetic sense, posture, motor skill, proprioception of core, proximal hip and LE for self care, mobility, lifting, and ambulation/stair navigation      Manual Treatments:  PROM / STM / Oscillations-Mobs:  G-I, II, III, IV (PA's, Inf., Post.)  [] (78599) Provided manual therapy to mobilize LE, proximal hip and/or LS spine soft tissue/joints for the purpose of modulating pain, promoting relaxation,  increasing ROM, reducing/eliminating soft tissue swelling/inflammation/restriction, improving soft tissue extensibility and allowing for proper ROM for normal function with self care, mobility, lifting and ambulation. Modalities:   Ice propped 10 minutes with 5 lbs, unpropped 5 min     Charges:  Timed Code Treatment Minutes: 54   Total Treatment Minutes: 76     [] EVAL (LOW) 94905   [] EVAL (MOD) 49126   [] EVAL (HIGH) 29374   [] RE-EVAL   [x] LX(08614) x 2     [] IONTO  [x] NMR (04780) x 1    [] VASO  [] Manual (37297) x      [] Other:   [x] TA x1     [] Mech Traction (27558)  [] ES(attended) (78189)      [] ES (un) (04219):       GOALS:  Patient stated goal: return to normal daily activities. Therapist goals for Patient:   Short Term Goals: To be achieved in: 2 weeks  1. Independent in HEP and progression per patient tolerance, in order to prevent re-injury. 2. Patient will have a decrease in pain to facilitate improvement in movement, function, and ADLs as indicated by Functional Deficits. Long Term Goals: To be achieved in: 10-12 weeks  1. Disability index score of 19% or less for the [facility] to assist with reaching prior level of function. 2. Patient will demonstrate increased AROM to 120+ flexion, 0 extension  to allow for proper joint functioning as indicated by patients Functional Deficits. 3. Patient will demonstrate an increase in Strength to 4/5 or greater  to allow for proper functional mobility as indicated by patients Functional Deficits. 4. Patient will return to ambulation on all surfaces without increased symptoms or restriction. 5. Patient will return to driving without increased symptoms or restriction. Progression Towards Functional goals:  [] Patient is progressing as expected towards functional goals listed. [] Progression is slowed due to complexities listed. [] Progression has been slowed due to co-morbidities. [x] Plan just implemented, too soon to assess goals progression  [] Other:     ASSESSMENT:  See eval    Treatment/Activity Tolerance:  [] Patient tolerated treatment well [] Patient limited by fatique  [] Patient limited by pain  [] Patient limited by other medical complications  [x] Other: Pt showed improved ROM into extension and flexion slightly today with decreased pain and mm guarding. Continue to progress as tolerated. Reviewed insurance benefits for physical therapy in an outpatient hospital based setting with the patient, including deductible of 0 and allowable visit number.  Pt was informed of possible out of pocket costs, as well as, informed of other service options such as the [facility] program to assist with cost and/or limited visit number. Prognosis: [] Good [] Fair  [] Poor    Patient Requires Follow-up: [x] Yes  [] No    PLAN: See eval; consider PT OP into extn or extensionator  [x] Continue per plan of care [] Alter current plan (see comments)  [] Plan of care initiated [] Hold pending MD visit [] Discharge  If patient does not return, this shall be considered DC summary.      Electronically signed by:   Debbi Hubbard, PT, DPT, Cert DN

## 2019-08-08 ENCOUNTER — TELEPHONE (OUTPATIENT)
Dept: ORTHOPEDIC SURGERY | Age: 59
End: 2019-08-08

## 2019-08-08 DIAGNOSIS — M17.12 ARTHRITIS OF LEFT KNEE: ICD-10-CM

## 2019-08-08 RX ORDER — OXYCODONE HYDROCHLORIDE 5 MG/1
5 TABLET ORAL EVERY 4 HOURS PRN
Qty: 42 TABLET | Refills: 0 | Status: SHIPPED | OUTPATIENT
Start: 2019-08-08 | End: 2019-08-15 | Stop reason: SDUPTHER

## 2019-08-09 ENCOUNTER — HOSPITAL ENCOUNTER (OUTPATIENT)
Dept: PHYSICAL THERAPY | Age: 59
Setting detail: THERAPIES SERIES
Discharge: HOME | End: 2019-08-09
Payer: MEDICARE

## 2019-08-09 PROCEDURE — 97112 NEUROMUSCULAR REEDUCATION: CPT

## 2019-08-09 PROCEDURE — 97110 THERAPEUTIC EXERCISES: CPT

## 2019-08-09 PROCEDURE — 97530 THERAPEUTIC ACTIVITIES: CPT

## 2019-08-09 NOTE — FLOWSHEET NOTE
for activities related to improving balance, coordination, kinesthetic sense, posture, motor skill, proprioception and motor activation to allow for proper function of core, proximal hip and LE with self care and ADLs  [x] (96376) Gait Re-education- Provided training and instruction to the patient for proper LE, core and proximal hip recruitment and positioning and eccentric body weight control with ambulation re-education including up and down stairs     Home Exercise Program:    [x] (45980) Reviewed/Progressed HEP activities related to strengthening, flexibility, endurance, ROM of core, proximal hip and LE for functional self-care, mobility, lifting and ambulation/stair navigation   [] (82723)Reviewed/Progressed HEP activities related to improving balance, coordination, kinesthetic sense, posture, motor skill, proprioception of core, proximal hip and LE for self care, mobility, lifting, and ambulation/stair navigation      Manual Treatments:  PROM / STM / Oscillations-Mobs:  G-I, II, III, IV (PA's, Inf., Post.)  [] (53459) Provided manual therapy to mobilize LE, proximal hip and/or LS spine soft tissue/joints for the purpose of modulating pain, promoting relaxation,  increasing ROM, reducing/eliminating soft tissue swelling/inflammation/restriction, improving soft tissue extensibility and allowing for proper ROM for normal function with self care, mobility, lifting and ambulation. Modalities: Ice propped 10 minutes with 5 lbs, unpropped 5 min     Charges:  Timed Code Treatment Minutes: 54   Total Treatment Minutes: 76     [] EVAL (LOW) 38016   [] EVAL (MOD) 61649   [] EVAL (HIGH) 01638   [] RE-EVAL   [x] XD(12190) x 2     [] IONTO  [x] NMR (01364) x 1    [] VASO  [] Manual (34731) x      [] Other:   [x] TA x1     [] Mech Traction (25056)  [] ES(attended) (58262)      [] ES (un) (06695):       GOALS:  Patient stated goal: return to normal daily activities.      Therapist goals for Patient:   Short Term Goals:

## 2019-08-12 ENCOUNTER — HOSPITAL ENCOUNTER (OUTPATIENT)
Dept: PHYSICAL THERAPY | Age: 59
Setting detail: THERAPIES SERIES
Discharge: HOME | End: 2019-08-12
Payer: MEDICARE

## 2019-08-12 PROCEDURE — 97112 NEUROMUSCULAR REEDUCATION: CPT | Performed by: PHYSICAL THERAPIST

## 2019-08-12 PROCEDURE — 97530 THERAPEUTIC ACTIVITIES: CPT | Performed by: PHYSICAL THERAPIST

## 2019-08-12 PROCEDURE — 97110 THERAPEUTIC EXERCISES: CPT | Performed by: PHYSICAL THERAPIST

## 2019-08-12 NOTE — FLOWSHEET NOTE
[address] [facility], [place]                                                         Physical Therapy Daily Treatment Note  Date:  2019    Patient Name:  Ted Etienne    :  1960  MRN: [id number]    Medical/Treatment Diagnosis Information:  · Diagnosis: S/P Left TKR - DOS 7/3/19  · Treatment Diagnosis: M25.562 left knee pain, M25.662 L knee stiffness      Insurance/Certification information:  PT Insurance Information: [employer]  Physician Information:  Referring Practitioner: Dr. Ilana Lowe    Date of Patient follow up with Physician:     Functional Assessment:  19  Functional Assessment scale used: WOMAC  Score: 47%    Progress Note: [x]  Yes  []  No  Next due by: Visit #10       Latex Allergy:  [x]NO      []YES  Preferred Language for Healthcare:   [x]English       []other:    Visit # Insurance Allowable    30     Pain level:  2/10 currently;     SUBJECTIVE: Pt reports his knee has been feeling alright. He feels like he has heat in his knee at times likely from exercises. He has been doing his exercises at home. He feels like he could get past 90 flex with seated knee flex in chair at home. His anti-inflammatories are helping so his knee does not swell up as much.      OBJECTIVE: 19    Observation: gait; single point cane, decreased TKE, decreased knee flexion LLE    Test measurements:    Girth:  Effusion  RIGHT LEFT   10cm superior to patella     Midline patella  40.5cm  42.25cm   10cm inferior to patella           Flexibility L R Comment   Hamstring      Gastroc      ITB      Quad                  19   ROM PROM  AROM Overpressure Comment    L R L R L R    Flexion 95 seated in chair         Extension   -5 with quad set (after stretches); -4 after prone hang                                 Strength L R Comment   Quad Poor voluntary quad set      Hamstring      Gastroc      Hip flexor Hip ABD                      RESTRICTIONS/PRECAUTIONS: sp L knee TKA 7/03    Exercises/Interventions:     Exercise/Equipment Resistance/Repetitions Other comments   Stretching     Hamstring [id number] propped    Hip Flexion     ITB     Grion     Quad     Inclined Calf 5 x 30 secs    Towel Pull [id number] propped         SLR     Supine 2 x 15 Mild lag; cues to reset quad and straighten knee between reps    Prone     Abduction 2 x 15    Adducton     SLR+     Clams     LAQ 3x10 3 lbs           Isometrics    Quad sets 10x10 on prop   Hip Adduction     Hamstring                    Patellar Glides     Medial    Superior    Inferior         ROM     Sheet Pulls [id number] supine 94 deg   Edge of bed     Flexionator [id number] 94 deg   Extensionator 5 x 30 secs -4 degrees   Hang Weights Prone 2 x 3 mins with 5 lbs    Ankle Pumps     Seated in chair 10 x 10 secs scooting leg underneath then scooting forward in chair 95 degrees                       CKC     Calf raises 3x10     Wall sits     Step ups 3x10 L2 Attempt step overs npv   Step up and over     Lateral Step Downs               Mini squats 2 x 15 to chair with airex then without for second set         CC TKE 2x15 L 5    Lateral band walks     Side Planks     Half moon     Single leg flow          Biodex-balance     Single leg stance 5x30 secs    Plyoback          Stool scoots     SB bridge     SB HS Curls     Planks          PRE     Extension  RANGE: 90/40   Flexion  RANGE: 0/90        Cable Column          Leg Press  RANGE: 70/10        Bike    Treadmill         trying to exaggerate TKE with heel strike          Therapeutic Exercise and NMR EXR  [x] (92432) Provided verbal/tactile cueing for activities related to strengthening, flexibility, endurance, ROM for improvements in LE, proximal hip, and core control with self care, mobility, lifting, ambulation.  [] (94480) Provided verbal/tactile cueing for activities related to improving balance, coordination, kinesthetic sense, posture, motor skill, proprioception  to assist with LE, proximal hip, and core control in self care, mobility, lifting, ambulation and eccentric single leg control. NMR and Therapeutic Activities:    [x] (30321 or 67530) Provided verbal/tactile cueing for activities related to improving balance, coordination, kinesthetic sense, posture, motor skill, proprioception and motor activation to allow for proper function of core, proximal hip and LE with self care and ADLs  [x] (85002) Gait Re-education- Provided training and instruction to the patient for proper LE, core and proximal hip recruitment and positioning and eccentric body weight control with ambulation re-education including up and down stairs     Home Exercise Program:    [x] (83083) Reviewed/Progressed HEP activities related to strengthening, flexibility, endurance, ROM of core, proximal hip and LE for functional self-care, mobility, lifting and ambulation/stair navigation   [] (27577)Reviewed/Progressed HEP activities related to improving balance, coordination, kinesthetic sense, posture, motor skill, proprioception of core, proximal hip and LE for self care, mobility, lifting, and ambulation/stair navigation      Manual Treatments:  PROM / STM / Oscillations-Mobs:  G-I, II, III, IV (PA's, Inf., Post.)  [] (93288) Provided manual therapy to mobilize LE, proximal hip and/or LS spine soft tissue/joints for the purpose of modulating pain, promoting relaxation,  increasing ROM, reducing/eliminating soft tissue swelling/inflammation/restriction, improving soft tissue extensibility and allowing for proper ROM for normal function with self care, mobility, lifting and ambulation. Modalities:   Ice propped 10 minutes with 8 lbs, unpropped 5 min     Charges:  Timed Code Treatment Minutes: 53   Total Treatment Minutes: 80     [] EVAL (LOW) 68852   [] EVAL (MOD) 03491   [] EVAL (HIGH) 72236   [] RE-EVAL   [x] QU(59584) x 2     [] IONTO  [x] NMR (28882) x 1    [] VASO  [] Manual (92647) x      [] Other:   [x] TA x1     [] Mech Traction (84437)  [] ES(attended) (79009)      [] ES (un) (15914):       GOALS:  Patient stated goal: return to normal daily activities. Therapist goals for Patient:   Short Term Goals: To be achieved in: 2 weeks  1. Independent in HEP and progression per patient tolerance, in order to prevent re-injury. 2. Patient will have a decrease in pain to facilitate improvement in movement, function, and ADLs as indicated by Functional Deficits. Long Term Goals: To be achieved in: 10-12 weeks  1. Disability index score of 19% or less for the [facility] to assist with reaching prior level of function. 2. Patient will demonstrate increased AROM to 120+ flexion, 0 extension  to allow for proper joint functioning as indicated by patients Functional Deficits. 3. Patient will demonstrate an increase in Strength to 4/5 or greater  to allow for proper functional mobility as indicated by patients Functional Deficits. 4. Patient will return to ambulation on all surfaces without increased symptoms or restriction. 5. Patient will return to driving without increased symptoms or restriction. Progression Towards Functional goals:  [] Patient is progressing as expected towards functional goals listed. [] Progression is slowed due to complexities listed. [] Progression has been slowed due to co-morbidities. [x] Plan just implemented, too soon to assess goals progression  [] Other:     ASSESSMENT:  See eval    Treatment/Activity Tolerance:  [] Patient tolerated treatment well [] Patient limited by fatique  [] Patient limited by pain  [] Patient limited by other medical complications  [x] Other: Pt did not get as much extn today so did add back in extensionator and continues to lack 4 degrees after stretches. He was again educated on need to do extn weight hangs at home that he says he is doing with his ice pack for a weight at home.  He was able to more consistently reach 94-95 degrees with knee flexion throughout exercises today. Again tried to get pt to try supine wall slides for knee ROM to allow him to give more OP but pt refused. Also discussed trying to get him an ERMI flexionator for at home but pt also refused this. He continues to have extn lag with SLR due to lack of full extn but was able to increase resistance on LAQ, CC TKE, and height of step on step ups. Pt will continue to be progressed per tolerance. Reviewed insurance benefits for physical therapy in an outpatient hospital based setting with the patient, including deductible of 0 and allowable visit number. Pt was informed of possible out of pocket costs, as well as, informed of other service options such as the [facility] [place] operated by [facility] program to assist with cost and/or limited visit number. Prognosis: [] Good [] Fair  [] Poor    Patient Requires Follow-up: [x] Yes  [] No    PLAN: See eval; consider PT OP into extn or extensionator; PROGRESS NOTE  [x] Continue per plan of care [] Alter current plan (see comments)  [] Plan of care initiated [] Hold pending MD visit [] Discharge  If patient does not return, this shall be considered DC summary.      Electronically signed by:   Namrata Reilly DPT

## 2019-08-14 ENCOUNTER — HOSPITAL ENCOUNTER (OUTPATIENT)
Dept: PHYSICAL THERAPY | Age: 59
Setting detail: THERAPIES SERIES
Discharge: HOME | End: 2019-08-14
Payer: MEDICARE

## 2019-08-14 PROCEDURE — 97112 NEUROMUSCULAR REEDUCATION: CPT

## 2019-08-14 PROCEDURE — 97110 THERAPEUTIC EXERCISES: CPT

## 2019-08-14 PROCEDURE — 97530 THERAPEUTIC ACTIVITIES: CPT

## 2019-08-14 NOTE — PLAN OF CARE
function of core, proximal hip and LE with self care and ADLs  [x] (23404) Gait Re-education- Provided training and instruction to the patient for proper LE, core and proximal hip recruitment and positioning and eccentric body weight control with ambulation re-education including up and down stairs     Home Exercise Program:    [x] (36293) Reviewed/Progressed HEP activities related to strengthening, flexibility, endurance, ROM of core, proximal hip and LE for functional self-care, mobility, lifting and ambulation/stair navigation   [] (77188)Reviewed/Progressed HEP activities related to improving balance, coordination, kinesthetic sense, posture, motor skill, proprioception of core, proximal hip and LE for self care, mobility, lifting, and ambulation/stair navigation      Manual Treatments:  PROM / STM / Oscillations-Mobs:  G-I, II, III, IV (PA's, Inf., Post.)  [] (80033) Provided manual therapy to mobilize LE, proximal hip and/or LS spine soft tissue/joints for the purpose of modulating pain, promoting relaxation,  increasing ROM, reducing/eliminating soft tissue swelling/inflammation/restriction, improving soft tissue extensibility and allowing for proper ROM for normal function with self care, mobility, lifting and ambulation. Modalities: Ice propped 10 minutes with 8 lbs, unpropped 5 min     Charges:  Timed Code Treatment Minutes: 56   Total Treatment Minutes: 78     [] EVAL (LOW) 29459   [] EVAL (MOD) 38363   [] EVAL (HIGH) 76018   [] RE-EVAL   [x] WK(67759) x 2     [] IONTO  [x] NMR (07650) x 1    [] VASO  [] Manual (30770) x      [] Other:   [x] TA x1     [] Mech Traction (41154)  [] ES(attended) (73023)      [] ES (un) (10162):       GOALS:  Patient stated goal: return to normal daily activities. Therapist goals for Patient:   Short Term Goals: To be achieved in: 2 weeks  1. Independent in HEP and progression per patient tolerance, in order to prevent re-injury.    2. Patient will have a decrease in pain to facilitate improvement in movement, function, and ADLs as indicated by Functional Deficits. Long Term Goals: To be achieved in: 10-12 weeks  1. Disability index score of 19% or less for the [facility] to assist with reaching prior level of function. 2. Patient will demonstrate increased AROM to 120+ flexion, 0 extension  to allow for proper joint functioning as indicated by patients Functional Deficits. 3. Patient will demonstrate an increase in Strength to 4/5 or greater  to allow for proper functional mobility as indicated by patients Functional Deficits. 4. Patient will return to ambulation on all surfaces without increased symptoms or restriction. 5. Patient will return to driving without increased symptoms or restriction. Progression Towards Functional goals:  [] Patient is progressing as expected towards functional goals listed. [] Progression is slowed due to complexities listed. [] Progression has been slowed due to co-morbidities. [x] Plan just implemented, too soon to assess goals progression  [] Other:     ASSESSMENT:  See eval    Treatment/Activity Tolerance:  [] Patient tolerated treatment well [] Patient limited by fatique  [] Patient limited by pain  [] Patient limited by other medical complications  [x] Other:Pt was more open to trying different ways to achieve more flexion today, did improve on ERMI and wall slides with motion after using recumbent bike. He did show improved extension today as well following prone hangs. He was encouraged to continue working on flexion/extension 3x/day but to reduce strengthening to 2x/day in order to reduce soreness. Pt to see MD tomorrow, continue with POC as advised. Reviewed insurance benefits for physical therapy in an outpatient hospital based setting with the patient, including deductible of 0 and allowable visit number.  Pt was informed of possible out of pocket costs, as well as, informed of other service options such as the [facility] program to assist with cost and/or limited visit number. Prognosis: [] Good [] Fair  [] Poor    Patient Requires Follow-up: [x] Yes  [] No    PLAN: See eval; consider PT OP into extn or extensionator; PROGRESS NOTE  [x] Continue per plan of care [] Alter current plan (see comments)  [] Plan of care initiated [] Hold pending MD visit [] Discharge  If patient does not return, this shall be considered DC summary.      Electronically signed by:   Ankit Catalan, PT, DPT, Cert DN

## 2019-08-15 ENCOUNTER — OFFICE VISIT (OUTPATIENT)
Dept: ORTHOPEDIC SURGERY | Age: 59
End: 2019-08-15

## 2019-08-15 VITALS — TEMPERATURE: 98.1 F | WEIGHT: 201 LBS | RESPIRATION RATE: 16 BRPM | BODY MASS INDEX: 28.77 KG/M2 | HEIGHT: 70 IN

## 2019-08-15 DIAGNOSIS — Z96.652 STATUS POST TOTAL LEFT KNEE REPLACEMENT: Primary | ICD-10-CM

## 2019-08-15 DIAGNOSIS — M17.12 ARTHRITIS OF LEFT KNEE: ICD-10-CM

## 2019-08-15 PROCEDURE — 99024 POSTOP FOLLOW-UP VISIT: CPT | Performed by: PHYSICIAN ASSISTANT

## 2019-08-15 RX ORDER — CYCLOBENZAPRINE HCL 5 MG
5 TABLET ORAL NIGHTLY PRN
Qty: 30 TABLET | Refills: 0 | Status: SHIPPED | OUTPATIENT
Start: 2019-08-15 | End: 2019-08-25

## 2019-08-15 RX ORDER — OXYCODONE HYDROCHLORIDE 5 MG/1
5 TABLET ORAL EVERY 4 HOURS PRN
Qty: 42 TABLET | Refills: 0 | Status: SHIPPED | OUTPATIENT
Start: 2019-08-15 | End: 2019-08-22 | Stop reason: SDUPTHER

## 2019-08-16 ENCOUNTER — HOSPITAL ENCOUNTER (OUTPATIENT)
Dept: PHYSICAL THERAPY | Age: 59
Setting detail: THERAPIES SERIES
Discharge: HOME | End: 2019-08-16
Payer: MEDICARE

## 2019-08-16 PROCEDURE — 97530 THERAPEUTIC ACTIVITIES: CPT | Performed by: PHYSICAL THERAPIST

## 2019-08-16 PROCEDURE — 97110 THERAPEUTIC EXERCISES: CPT | Performed by: PHYSICAL THERAPIST

## 2019-08-16 PROCEDURE — 97112 NEUROMUSCULAR REEDUCATION: CPT | Performed by: PHYSICAL THERAPIST

## 2019-08-16 NOTE — FLOWSHEET NOTE
Hamstring [id number] propped    Hip Flexion     ITB     Grion     Quad     Inclined Calf 5 x 30 secs    Towel Pull          SLR     Supine 2 x 15 Mild lag; cues to reset quad and straighten knee between reps    Prone     Abduction 2 x 15    Adducton     SLR+     Clams     LAQ 3x10 3 lbs           Isometrics    Quad sets    Hip Adduction     Hamstring                    Patellar Glides     Medial    Superior    Inferior         ROM        Wall Slides 10x10  104 deg    Edge of bed     Flexionator [id number] 101 deg   -4 degrees   Hang Weights Prone 2 x 3 mins with 5 lbs -1 deg   Ankle Pumps     95 degrees                       CKC     Calf raises 3x10     Wall sits     Step ups Step up and over 3x10 L2     Lateral Step Downs               Mini squats 2 x 15 to chair with airex then without for second set         CC TKE 2x15 L 5    Lateral band walks     Side Planks     Half moon     Single leg flow          Biodex-balance     Single leg stance 5x30 secs    Plyoback          Stool scoots     Bridging  2x15    SB HS Curls     Planks          PRE     Extension  RANGE: 90/40   Flexion  RANGE: 0/90        Cable Column          Leg Press  RANGE: 70/10        Bike 5 min rocking ROM    Treadmill        Cone walks    trying to exaggerate TKE with heel strike          Therapeutic Exercise and NMR EXR  [x] (61714) Provided verbal/tactile cueing for activities related to strengthening, flexibility, endurance, ROM for improvements in LE, proximal hip, and core control with self care, mobility, lifting, ambulation.  [] (67327) Provided verbal/tactile cueing for activities related to improving balance, coordination, kinesthetic sense, posture, motor skill, proprioception  to assist with LE, proximal hip, and core control in self care, mobility, lifting, ambulation and eccentric single leg control.      NMR and Therapeutic Activities:    [x] (81779 or 00322) Provided verbal/tactile cueing for activities related to improving balance, coordination, kinesthetic sense, posture, motor skill, proprioception and motor activation to allow for proper function of core, proximal hip and LE with self care and ADLs  [x] (06836) Gait Re-education- Provided training and instruction to the patient for proper LE, core and proximal hip recruitment and positioning and eccentric body weight control with ambulation re-education including up and down stairs     Home Exercise Program:    [x] (61895) Reviewed/Progressed HEP activities related to strengthening, flexibility, endurance, ROM of core, proximal hip and LE for functional self-care, mobility, lifting and ambulation/stair navigation   [] (57636)Reviewed/Progressed HEP activities related to improving balance, coordination, kinesthetic sense, posture, motor skill, proprioception of core, proximal hip and LE for self care, mobility, lifting, and ambulation/stair navigation      Manual Treatments:  PROM / STM / Oscillations-Mobs:  G-I, II, III, IV (PA's, Inf., Post.)  [] (37667) Provided manual therapy to mobilize LE, proximal hip and/or LS spine soft tissue/joints for the purpose of modulating pain, promoting relaxation,  increasing ROM, reducing/eliminating soft tissue swelling/inflammation/restriction, improving soft tissue extensibility and allowing for proper ROM for normal function with self care, mobility, lifting and ambulation. Modalities: Ice propped 10 minutes with 8 lbs, unpropped 5 min     Charges:  Timed Code Treatment Minutes: 53   Total Treatment Minutes: 76     [] EVAL (LOW) 20672   [] EVAL (MOD) 45609   [] EVAL (HIGH) 83971   [] RE-EVAL   [x] HM(14575) x 2     [] IONTO  [x] NMR (94423) x 1    [] VASO  [] Manual (56952) x      [] Other:   [x] TA x1     [] Mech Traction (66868)  [] ES(attended) (20215)      [] ES (un) (33624):       GOALS:  Patient stated goal: return to normal daily activities. Therapist goals for Patient:   Short Term Goals: To be achieved in: 2 weeks  1.  Independent in

## 2019-08-19 ENCOUNTER — HOSPITAL ENCOUNTER (OUTPATIENT)
Dept: PHYSICAL THERAPY | Age: 59
Setting detail: THERAPIES SERIES
Discharge: HOME | End: 2019-08-19
Payer: MEDICARE

## 2019-08-19 PROCEDURE — 97112 NEUROMUSCULAR REEDUCATION: CPT | Performed by: PHYSICAL THERAPIST

## 2019-08-19 PROCEDURE — 97110 THERAPEUTIC EXERCISES: CPT | Performed by: PHYSICAL THERAPIST

## 2019-08-19 PROCEDURE — 97530 THERAPEUTIC ACTIVITIES: CPT | Performed by: PHYSICAL THERAPIST

## 2019-08-21 ENCOUNTER — HOSPITAL ENCOUNTER (OUTPATIENT)
Dept: PHYSICAL THERAPY | Age: 59
Setting detail: THERAPIES SERIES
Discharge: HOME | End: 2019-08-21
Payer: MEDICARE

## 2019-08-21 PROCEDURE — 97530 THERAPEUTIC ACTIVITIES: CPT

## 2019-08-21 PROCEDURE — 97110 THERAPEUTIC EXERCISES: CPT

## 2019-08-21 PROCEDURE — 97112 NEUROMUSCULAR REEDUCATION: CPT

## 2019-08-21 NOTE — FLOWSHEET NOTE
[address] [facility], [place]                                                        Physical Therapy Daily Treatment Note  Date:  2019    Patient Name:  Trell Damon    :  1960  MRN: [id number]    Medical/Treatment Diagnosis Information:  · Diagnosis: S/P Left TKR - DOS 7/3/19  · Treatment Diagnosis: M25.562 left knee pain, M25.662 L knee stiffness      Insurance/Certification information:  PT Insurance Information: [place] [employer]  Physician Information:  Referring Practitioner: Dr. Herbie Peñaloza    Date of Patient follow up with Physician:     Functional Assessment:  19  Functional Assessment scale used: WOMAC  Score: 29%    19  Functional Assessment scale used: WOMAC  Score: 47%    Progress Note: [x]  Yes  []  No  Next due by: Visit #20      Latex Allergy:  [x]NO      []YES  Preferred Language for Healthcare:   [x]English       []other:    Visit # Insurance Allowable   12 30     Pain level:  2/10 currently;     SUBJECTIVE:  Pt notes he had more swelling after Monday's session and he had to ice 6-7 times to get it to go down. He also feels like he wakes up with his knee bent in the mornings. He did put heat on his knee before coming in this morning. Pt also notes that he is trying to move to [place] in 2-3 weeks.        OBJECTIVE: 19    Observation: gait; single point cane, decreased TKE, decreased knee flexion LLE    Test measurements:    Girth:  Effusion  RIGHT LEFT   10cm superior to patella     Midline patella  40.5cm  42.25cm   10cm inferior to patella           Flexibility L R Comment   Hamstring      Gastroc      ITB      Quad                  19   ROM PROM  AROM Overpressure Comment    L R L R L R    Flexion 110 ERMI; 104 seated in chair           Extension   -1 with quad sets after weight hang                               Strength L R Comment   Quad 4-     Hamstring 4     Gastroc today. We discussed that he should call Dr. Angi Mcmahon office to discuss transferring care to a physician down in [place]/if they have any recommendations and then PT care could also be transferred. Reviewed insurance benefits for physical therapy in an outpatient hospital based setting with the patient, including deductible of 0 and allowable visit number. Pt was informed of possible out of pocket costs, as well as, informed of other service options such as the [facility] program to assist with cost and/or limited visit number. Prognosis: [] Good [] Fair  [] Poor    Patient Requires Follow-up: [x] Yes  [] No    PLAN: See eval; consider PT OP into extn or extensionator; consider PRE extn  [x] Continue per plan of care [] Alter current plan (see comments)  [] Plan of care initiated [] Hold pending MD visit [] Discharge  If patient does not return, this shall be considered DC summary.      Electronically signed by:   Gabe Garber DPT [id number]

## 2019-08-22 ENCOUNTER — TELEPHONE (OUTPATIENT)
Dept: ORTHOPEDIC SURGERY | Age: 59
End: 2019-08-22

## 2019-08-22 DIAGNOSIS — M17.12 ARTHRITIS OF LEFT KNEE: ICD-10-CM

## 2019-08-23 ENCOUNTER — HOSPITAL ENCOUNTER (OUTPATIENT)
Dept: PHYSICAL THERAPY | Age: 59
Setting detail: THERAPIES SERIES
Discharge: HOME | End: 2019-08-23
Payer: MEDICARE

## 2019-08-23 PROCEDURE — 97110 THERAPEUTIC EXERCISES: CPT

## 2019-08-23 PROCEDURE — 97112 NEUROMUSCULAR REEDUCATION: CPT

## 2019-08-23 PROCEDURE — 97530 THERAPEUTIC ACTIVITIES: CPT

## 2019-08-23 PROCEDURE — 97016 VASOPNEUMATIC DEVICE THERAPY: CPT

## 2019-08-23 RX ORDER — OXYCODONE HYDROCHLORIDE 5 MG/1
5 TABLET ORAL EVERY 6 HOURS PRN
Qty: 28 TABLET | Refills: 0 | Status: SHIPPED | OUTPATIENT
Start: 2019-08-23 | End: 2019-08-29 | Stop reason: SDUPTHER

## 2019-08-23 NOTE — FLOWSHEET NOTE
comments   Stretching     Hamstring [id number] propped    Hip Flexion     ITB     Grion     Quad     Inclined Calf 5 x 30 secs    Towel Pull          SLR     Supine 2 x 15 Mild lag; cues to reset quad and straighten knee between reps    Prone     Abduction 2 x 15    Adducton     SLR+     Clams                Isometrics    Quad sets    Hip Adduction     Hamstring                    Patellar Glides     Medial    Superior    Inferior         ROM        107 deg    Edge of bed     Flexionator [id number] 110 deg   -4 degrees   Hang Weights  -1 deg   Ankle Pumps     Seated in chair 5 x 10 secs scooting leg underneath then scooting forward in chair 104 degrees                       CKC     Calf raises 3x10     Wall sits     Step ups Step up and over 3x10 L2  Attempting some with landing on opposite heel instead of toes.     Lateral Step Downs               Mini squats 2 x 15 to chair with airex then without for second set         CC TKE 2x15 L 5    Lateral band walks     Side Planks     Half moon     Single leg flow          Biodex-balance     Single leg stance 5x30 secs Increase with airex NPV   Plyoback          Stool scoots     Bridging      SB HS Curls     Planks          PRE     Extension 3x10 20 lbs  RANGE: 90/40   Flexion 3 x 10 40 lbs RANGE: 0/90        Cable Column          Leg Press 3 x 10 100 lbs RANGE: 70/10        Bike 5 min rocking ROM    Treadmill        Cone walks    trying to exaggerate TKE with heel strike          Therapeutic Exercise and NMR EXR  [x] (00042) Provided verbal/tactile cueing for activities related to strengthening, flexibility, endurance, ROM for improvements in LE, proximal hip, and core control with self care, mobility, lifting, ambulation.  [] (34464) Provided verbal/tactile cueing for activities related to improving balance, coordination, kinesthetic sense, posture, motor skill, proprioception  to assist with LE, proximal hip, and core control in self care, mobility, lifting, ambulation and

## 2019-08-26 ENCOUNTER — HOSPITAL ENCOUNTER (OUTPATIENT)
Dept: PHYSICAL THERAPY | Age: 59
Setting detail: THERAPIES SERIES
Discharge: HOME | End: 2019-08-26
Payer: MEDICARE

## 2019-08-26 PROCEDURE — 97530 THERAPEUTIC ACTIVITIES: CPT | Performed by: PHYSICAL THERAPIST

## 2019-08-26 PROCEDURE — 97110 THERAPEUTIC EXERCISES: CPT | Performed by: PHYSICAL THERAPIST

## 2019-08-26 PROCEDURE — 97112 NEUROMUSCULAR REEDUCATION: CPT | Performed by: PHYSICAL THERAPIST

## 2019-08-26 NOTE — FLOWSHEET NOTE
LE, proximal hip, and core control with self care, mobility, lifting, ambulation.  [] (13839) Provided verbal/tactile cueing for activities related to improving balance, coordination, kinesthetic sense, posture, motor skill, proprioception  to assist with LE, proximal hip, and core control in self care, mobility, lifting, ambulation and eccentric single leg control. NMR and Therapeutic Activities:    [x] (36618 or 27391) Provided verbal/tactile cueing for activities related to improving balance, coordination, kinesthetic sense, posture, motor skill, proprioception and motor activation to allow for proper function of core, proximal hip and LE with self care and ADLs  [x] (04549) Gait Re-education- Provided training and instruction to the patient for proper LE, core and proximal hip recruitment and positioning and eccentric body weight control with ambulation re-education including up and down stairs     Home Exercise Program:    [x] (17602) Reviewed/Progressed HEP activities related to strengthening, flexibility, endurance, ROM of core, proximal hip and LE for functional self-care, mobility, lifting and ambulation/stair navigation   [] (14496)Reviewed/Progressed HEP activities related to improving balance, coordination, kinesthetic sense, posture, motor skill, proprioception of core, proximal hip and LE for self care, mobility, lifting, and ambulation/stair navigation      Manual Treatments:  PROM / STM / Oscillations-Mobs:  G-I, II, III, IV (PA's, Inf., Post.)  [] (77985) Provided manual therapy to mobilize LE, proximal hip and/or LS spine soft tissue/joints for the purpose of modulating pain, promoting relaxation,  increasing ROM, reducing/eliminating soft tissue swelling/inflammation/restriction, improving soft tissue extensibility and allowing for proper ROM for normal function with self care, mobility, lifting and ambulation.      Modalities:  Ice x 15 mins with heel propped and 8 lbs weight hang 10'

## 2019-08-28 ENCOUNTER — APPOINTMENT (OUTPATIENT)
Dept: PHYSICAL THERAPY | Age: 59
End: 2019-08-28
Payer: MEDICARE

## 2019-08-29 ENCOUNTER — TELEPHONE (OUTPATIENT)
Dept: ORTHOPEDIC SURGERY | Age: 59
End: 2019-08-29

## 2019-08-29 DIAGNOSIS — M17.12 ARTHRITIS OF LEFT KNEE: ICD-10-CM

## 2019-08-30 ENCOUNTER — TELEPHONE (OUTPATIENT)
Dept: ORTHOPEDIC SURGERY | Age: 59
End: 2019-08-30

## 2019-08-30 ENCOUNTER — HOSPITAL ENCOUNTER (OUTPATIENT)
Dept: PHYSICAL THERAPY | Age: 59
Setting detail: THERAPIES SERIES
Discharge: HOME | End: 2019-08-30
Payer: MEDICARE

## 2019-08-30 DIAGNOSIS — M17.12 ARTHRITIS OF LEFT KNEE: ICD-10-CM

## 2019-08-30 PROCEDURE — 97112 NEUROMUSCULAR REEDUCATION: CPT

## 2019-08-30 PROCEDURE — 97110 THERAPEUTIC EXERCISES: CPT

## 2019-08-30 PROCEDURE — 97016 VASOPNEUMATIC DEVICE THERAPY: CPT

## 2019-08-30 PROCEDURE — 97530 THERAPEUTIC ACTIVITIES: CPT

## 2019-08-30 RX ORDER — OXYCODONE HYDROCHLORIDE 5 MG/1
5 TABLET ORAL EVERY 6 HOURS PRN
Qty: 28 TABLET | Refills: 0 | Status: SHIPPED | OUTPATIENT
Start: 2019-08-30 | End: 2019-09-05 | Stop reason: SDUPTHER

## 2019-08-30 NOTE — FLOWSHEET NOTE
[address] [facility], [place]                                                        Physical Therapy Daily Treatment Note  Date:  2019    Patient Name:  Maria Elena Fermin    :  1960  MRN: [id number]    Medical/Treatment Diagnosis Information:  · Diagnosis: S/P Left TKR - DOS 7/3/19  · Treatment Diagnosis: M25.562 left knee pain, M25.662 L knee stiffness      Insurance/Certification information:  PT Insurance Information: [place] [employer]  Physician Information:  Referring Practitioner: Dr. Rebecca Ochoa    Date of Patient follow up with Physician:     Functional Assessment:  19  Functional Assessment scale used: WOMAC  Score: 29%    19  Functional Assessment scale used: WOMAC  Score: 47%    Progress Note: [x]  Yes  []  No  Next due by: Visit #20      Latex Allergy:  [x]NO      []YES  Preferred Language for Healthcare:   [x]English       []other:    Visit # Insurance Allowable   15 30     Pain level:  2/10 currently     SUBJECTIVE:  Pt reports he feels he is doing better currently but also finally had his pain medication refilled. He was frustrated it took so long this time to get a refill.  He notes he is likely moving next week but he has not looked into a physical therapy clinic to transfer to down there because he has been worried about \"taking care of real life matters first\"        OBJECTIVE: 19    Observation: gait; single point cane, decreased TKE, decreased knee flexion LLE    Test measurements:    Girth:  Effusion  RIGHT LEFT   10cm superior to patella     Midline patella  40.5cm  42.25cm   10cm inferior to patella           Flexibility L R Comment   Hamstring      Gastroc      ITB      Quad                  19   ROM PROM  AROM Overpressure Comment    L R L R L R    Flexion 115 ERMI;            Extension   -1 with quad sets; -2 at rest                               Strength L R Comment

## 2019-09-03 ENCOUNTER — HOSPITAL ENCOUNTER (OUTPATIENT)
Dept: PHYSICAL THERAPY | Age: 59
Setting detail: THERAPIES SERIES
Discharge: HOME | End: 2019-09-03
Payer: MEDICARE

## 2019-09-03 PROCEDURE — 97110 THERAPEUTIC EXERCISES: CPT

## 2019-09-03 PROCEDURE — 97016 VASOPNEUMATIC DEVICE THERAPY: CPT

## 2019-09-03 PROCEDURE — 97530 THERAPEUTIC ACTIVITIES: CPT

## 2019-09-03 PROCEDURE — 97112 NEUROMUSCULAR REEDUCATION: CPT

## 2019-09-03 NOTE — FLOWSHEET NOTE
propped    Hip Flexion     ITB     Grion     Quad     Inclined Calf 5 x 30 secs    Towel Pull          SLR     Supine 2 x 15 2lbs Mild lag; cues to reset quad and straighten knee between reps    Prone     Abduction 2 x 15 2 lbs    Adducton     SLR+     Clams                Isometrics    Quad sets    Hip Adduction     Hamstring                    Patellar Glides     Medial 1'    Superior 1'    Inferior 1'         ROM        Wall Slides 2x10  112 deg with PT OP   Edge of bed     Flexionator [id number] 106 deg   -4 degrees   Hang Weights Prone 2 x 3 mins with 5 lbs -1 deg   Ankle Pumps     105 degrees                       CKC     Calf raises 3x10 - SL    Wall sits 3x20 sec     Step ups Step up and over 3x10 L3 Attempting some with landing on opposite heel instead of toes.     Lateral Step Downs               Mini squats 2 x 15 to chair with airex then without for second set         CC TKE 2x15 L 5    Lateral band walks     Side Planks     Half moon     Single leg flow     Lateral heel taps 3 x 5, 2 inch step    Biodex-balance     Single leg stance 5x30 secs Increase with airex NPV   Plyoback          Stool scoots     Bridging      SB HS Curls     Planks          PRE     Extension 3x10 30 lbs SL ECL RANGE: 90/40   Flexion 3 x 10 40 lbs SL ECL  RANGE: 0/90        Cable Column          Leg Press 3 x 10 120 lbs RANGE: 70/10        Bike 8 min full ROM- increased seat height to highest height   Treadmill        Cone walks    trying to exaggerate TKE with heel strike          Therapeutic Exercise and NMR EXR  [x] (10690) Provided verbal/tactile cueing for activities related to strengthening, flexibility, endurance, ROM for improvements in LE, proximal hip, and core control with self care, mobility, lifting, ambulation.  [] (97640) Provided verbal/tactile cueing for activities related to improving balance, coordination, kinesthetic sense, posture, motor skill, proprioception  to assist with LE, proximal hip, and core control in GOALS:  Patient stated goal: return to normal daily activities. Therapist goals for Patient:   Short Term Goals: To be achieved in: 2 weeks  1. Independent in HEP and progression per patient tolerance, in order to prevent re-injury. 2. Patient will have a decrease in pain to facilitate improvement in movement, function, and ADLs as indicated by Functional Deficits. Long Term Goals: To be achieved in: 10-12 weeks  1. Disability index score of 19% or less for the [facility] to assist with reaching prior level of function. 2. Patient will demonstrate increased AROM to 120+ flexion, 0 extension  to allow for proper joint functioning as indicated by patients Functional Deficits. 3. Patient will demonstrate an increase in Strength to 4/5 or greater  to allow for proper functional mobility as indicated by patients Functional Deficits. 4. Patient will return to ambulation on all surfaces without increased symptoms or restriction. 5. Patient will return to driving without increased symptoms or restriction. Progression Towards Functional goals:  [x] Patient is progressing as expected towards functional goals listed. [] Progression is slowed due to complexities listed. [] Progression has been slowed due to co-morbidities. [] Plan just implemented, too soon to assess goals progression  [] Other:     ASSESSMENT:  See eval    Treatment/Activity Tolerance:  [] Patient tolerated treatment well [] Patient limited by fatique  [] Patient limited by pain  [] Patient limited by other medical complications  [x] Other:  Pt shows somewhat tighter extension today than previous sessions but he does show maintained flexion at 115 on ermi although still painful at end range. We have discussed at length the importance of continuing PT after he moves in order to continue to progress.  Pt's last session at the end of this week, will provide pt with current HEP as needed to use until he can be seen by PT in [place] Reviewed insurance benefits for physical therapy in an outpatient hospital based setting with the patient, including deductible of 0 and allowable visit number. Pt was informed of possible out of pocket costs, as well as, informed of other service options such as the [facility] program to assist with cost and/or limited visit number. Prognosis: [] Good [] Fair  [] Poor    Patient Requires Follow-up: [x] Yes  [] No    PLAN: See eval;   [x] Continue per plan of care [] Alter current plan (see comments)  [] Plan of care initiated [] Hold pending MD visit [] Discharge  If patient does not return, this shall be considered DC summary.      Electronically signed by:   Annette Hubbard, PT, DPT, Cert DN

## 2019-09-05 RX ORDER — OXYCODONE HYDROCHLORIDE 5 MG/1
5 TABLET ORAL EVERY 8 HOURS PRN
Qty: 21 TABLET | Refills: 0 | Status: SHIPPED | OUTPATIENT
Start: 2019-09-05 | End: 2019-09-18 | Stop reason: SDUPTHER

## 2019-09-06 ENCOUNTER — HOSPITAL ENCOUNTER (OUTPATIENT)
Dept: PHYSICAL THERAPY | Age: 59
Setting detail: THERAPIES SERIES
Discharge: HOME | End: 2019-09-06
Payer: MEDICARE

## 2019-09-06 NOTE — PLAN OF CARE
Physical Therapy Discharge Summary      Date:  2019    Patient Name:  Yane Vega    :  1960  MRN: [id number]  Restrictions/Precautions:   Medical/Treatment Diagnosis Information:   · Diagnosis: S/P Left TKR - DOS 7/3/19  · Treatment Diagnosis: M25.562 left knee pain, M25.662 L knee stiffness      Insurance/Certification information:  PT Insurance Information: [place] [employer]  Physician Information:  Referring Practitioner: Dr. Hue Jeong     Dear  Dr. Hue Jeong,    We had the pleasure of treating the following patient for physical therapy services at [address] [place] [address]. A summary of our findings can be found in the discharge summary below. If you have any questions or concerns regarding these findings, please do not hesitate to contact me at the office phone number checked above. Thank you for the referral.     Physician Signature:________________________________Date:__________________  By signing above (or electronic signature), therapists plan is approved by physician      Overall Response to Treatment:   []Patient is responding well to treatment and improvement is noted with regards  to goals   []Patient should continue to improve in reasonable time if they continue HEP   []Patient has plateaued and is no longer responding to skilled PT intervention    []Patient is getting worse and would benefit from return to referring MD   []Patient unable to adhere to initial POC   []Other:  Pt showed improving flexion ROM however was still lacking into extension. He was had been able to progress functional strengthening but was cautious of progressions d/t pain levels. He informed PT at last visit that he was moving to [place] so he will no longer continue care at this location. Pt was highly encouraged to transfer care to another outpatient clinic and to discuss transition of care with MD but as of last visit, this information had not been obtained. ANJELICA PT at this time.       Date range

## 2019-09-18 ENCOUNTER — TELEPHONE (OUTPATIENT)
Dept: ORTHOPEDIC SURGERY | Age: 59
End: 2019-09-18

## 2019-09-18 DIAGNOSIS — M17.12 ARTHRITIS OF LEFT KNEE: ICD-10-CM

## 2019-09-18 RX ORDER — OXYCODONE HYDROCHLORIDE 5 MG/1
5 TABLET ORAL 2 TIMES DAILY
Qty: 14 TABLET | Refills: 0 | Status: SHIPPED | OUTPATIENT
Start: 2019-09-18 | End: 2019-09-25

## 2020-09-18 ENCOUNTER — TELEPHONE (OUTPATIENT)
Dept: INTERNAL MEDICINE CLINIC | Age: 60
End: 2020-09-18

## 2020-09-18 NOTE — TELEPHONE ENCOUNTER
Wants refill for anti inflamatory for gout. Doesn't remember name. Gout is in left heel.     Iram Quiros

## 2020-09-21 RX ORDER — PREDNISONE 10 MG/1
30 TABLET ORAL DAILY
Qty: 15 TABLET | Refills: 0 | Status: SHIPPED | OUTPATIENT
Start: 2020-09-21 | End: 2020-09-26

## 2020-09-21 RX ORDER — INDOMETHACIN 50 MG/1
50 CAPSULE ORAL 2 TIMES DAILY WITH MEALS
Qty: 60 CAPSULE | Refills: 3 | Status: SHIPPED | OUTPATIENT
Start: 2020-09-21

## (undated) DEVICE — SOLUTION IV IRRIG POUR BRL 0.9% SODIUM CHL 2F7124

## (undated) DEVICE — MATTRESS MAXI AIR TRNSF SGL PT USE DCS 37 45 48 52 75

## (undated) DEVICE — BANDAGE COMPR W4INXL15FT BGE E SGL LAYERED CLP CLSR

## (undated) DEVICE — STERILE PATIENT PROTECTIVE PAD FOR IMP® KNEE POSITIONERS & COHESIVE WRAP (10 / CASE): Brand: DE MAYO KNEE POSITIONER®

## (undated) DEVICE — HANDPIECE SET WITH HIGH FLOW TIP AND SUCTION TUBE: Brand: INTERPULSE

## (undated) DEVICE — COTTON UNDERCAST PADDING,CRIMPED FINISH: Brand: WEBRIL

## (undated) DEVICE — TOTAL BASIC PK

## (undated) DEVICE — COVER,MAYO STAND,STERILE: Brand: MEDLINE

## (undated) DEVICE — SUTURE VCRL SZ 1 L18IN ABSRB UD L36MM CT-1 1/2 CIR J841D

## (undated) DEVICE — PAD,NON-ADHERENT,3X8,STERILE,LF,1/PK: Brand: MEDLINE

## (undated) DEVICE — GOWN,REINF,POLY,AURORA,XLNG/XXL,STRL: Brand: MEDLINE

## (undated) DEVICE — ELECTRODE PT RET AD L9FT HI MOIST COND ADH HYDRGEL CORDED

## (undated) DEVICE — SUTURE STRATAFIX SPRL SZ 2 0 L14IN ABSRB UD MH L36MM 1 2 CIR SXMD2B401

## (undated) DEVICE — ZIMMER® STERILE DISPOSABLE TOURNIQUET CUFF WITH PLC, DUAL PORT, SINGLE BLADDER, 34 IN. (86 CM)

## (undated) DEVICE — HYPODERMIC SAFETY NEEDLE: Brand: MAGELLAN

## (undated) DEVICE — BASIC SINGLE BASIN 1-LF: Brand: MEDLINE INDUSTRIES, INC.

## (undated) DEVICE — BONE SCREW 6.5X25 SELF-TAP
Type: IMPLANTABLE DEVICE | Site: KNEE | Status: NON-FUNCTIONAL
Removed: 2019-07-03

## (undated) DEVICE — BANDAGE COMPR W6INXL10YD ST M E WHITE/BEIGE

## (undated) DEVICE — BIT DRL L100MM DIA2.5MM FOR SM FRAG UNIV LOK SYS

## (undated) DEVICE — SPECIMEN COLLECTION 4-PORT MANIFOLD: Brand: NEPTUNE 2

## (undated) DEVICE — STERILE TOTAL KNEE DRAPE PACK: Brand: CARDINAL HEALTH

## (undated) DEVICE — DUAL CUT SAGITTAL BLADE

## (undated) DEVICE — HEADLESS TROCHAR PIN 75MM: Brand: ZUK

## (undated) DEVICE — SYRINGE MED 30ML STD CLR PLAS LUERLOCK TIP N CTRL DISP

## (undated) DEVICE — SHEET,DRAPE,53X77,STERILE: Brand: MEDLINE

## (undated) DEVICE — SYSTEM SKIN CLSR 22CM DERMBND PRINEO

## (undated) DEVICE — BLADE SAW W12.5XL70MM THK1MM RECIP DBL SIDE OFFSET

## (undated) DEVICE — SOLUTION IV 1000ML 0.9% SOD CHL FOR IRRIG PLAS CONT

## (undated) DEVICE — DECANTER BAG 9": Brand: MEDLINE INDUSTRIES, INC.

## (undated) DEVICE — GLOVE SURG SZ 85 L12IN FNGR THK13MIL WHT ISOLEX POLYISOPRENE

## (undated) DEVICE — 3M™ COBAN™ NL STERILE NON-LATEX SELF-ADHERENT WRAP, 2083S, 3 IN X 5 YD (7,5 CM X 4,5 M), 24 ROLLS/CASE: Brand: 3M™ COBAN™

## (undated) DEVICE — SUTURE ABSORBABLE MONOFILAMENT 1-0 OS8 14 IN STRATAFIX SPRL SXPD2B202

## (undated) DEVICE — SOLUTION PREP POVIDONE IOD FOR SKIN MUCOUS MEM PRIOR TO

## (undated) DEVICE — GLOVE,SURG,SENSICARE SLT,LF,PF,8.5: Brand: MEDLINE

## (undated) DEVICE — KIT OR ROOM TURNOVER W/STRAP

## (undated) DEVICE — KIT STEREOTAXIC POD DISPOSABLE IASSIST

## (undated) DEVICE — SCREW BONE L38MM 3.5MM KNEE HEX HD IASSIST
Type: IMPLANTABLE DEVICE | Site: KNEE | Status: NON-FUNCTIONAL
Removed: 2019-07-03

## (undated) DEVICE — Z DISCONTINUED USE 2716304 SUTURE STRATAFIX SPRL SZ 3-0 L12IN ABSRB UD FS-1 L24MM 3/8

## (undated) DEVICE — COVER LT HNDL BLU PLAS

## (undated) DEVICE — GLOVE,SURG,SENSICARE SLT,LF,PF,8: Brand: MEDLINE

## (undated) DEVICE — COVER,TABLE,77X90,STERILE: Brand: MEDLINE

## (undated) DEVICE — CHLORAPREP 26ML ORANGE